# Patient Record
Sex: MALE | Race: ASIAN | NOT HISPANIC OR LATINO | URBAN - METROPOLITAN AREA
[De-identification: names, ages, dates, MRNs, and addresses within clinical notes are randomized per-mention and may not be internally consistent; named-entity substitution may affect disease eponyms.]

---

## 2021-01-16 ENCOUNTER — INPATIENT (INPATIENT)
Facility: HOSPITAL | Age: 36
LOS: 2 days | Discharge: HOME | End: 2021-01-19
Attending: UROLOGY | Admitting: UROLOGY
Payer: MEDICAID

## 2021-01-16 ENCOUNTER — TRANSCRIPTION ENCOUNTER (OUTPATIENT)
Age: 36
End: 2021-01-16

## 2021-01-16 VITALS
OXYGEN SATURATION: 100 % | TEMPERATURE: 100 F | RESPIRATION RATE: 18 BRPM | SYSTOLIC BLOOD PRESSURE: 161 MMHG | HEART RATE: 106 BPM | DIASTOLIC BLOOD PRESSURE: 112 MMHG

## 2021-01-16 LAB
ALBUMIN SERPL ELPH-MCNC: 3.8 G/DL — SIGNIFICANT CHANGE UP (ref 3.3–5)
ALP SERPL-CCNC: 123 U/L — HIGH (ref 40–120)
ALT FLD-CCNC: 15 U/L — SIGNIFICANT CHANGE UP (ref 4–41)
ANION GAP SERPL CALC-SCNC: 20 MMOL/L — HIGH (ref 7–14)
APPEARANCE UR: ABNORMAL
AST SERPL-CCNC: 8 U/L — SIGNIFICANT CHANGE UP (ref 4–40)
BACTERIA # UR AUTO: ABNORMAL
BASOPHILS # BLD AUTO: 0.04 K/UL — SIGNIFICANT CHANGE UP (ref 0–0.2)
BASOPHILS NFR BLD AUTO: 0.3 % — SIGNIFICANT CHANGE UP (ref 0–2)
BILIRUB SERPL-MCNC: 0.8 MG/DL — SIGNIFICANT CHANGE UP (ref 0.2–1.2)
BILIRUB UR-MCNC: NEGATIVE — SIGNIFICANT CHANGE UP
BLOOD GAS VENOUS COMPREHENSIVE RESULT: SIGNIFICANT CHANGE UP
BUN SERPL-MCNC: 79 MG/DL — HIGH (ref 7–23)
CALCIUM SERPL-MCNC: 9.2 MG/DL — SIGNIFICANT CHANGE UP (ref 8.4–10.5)
CHLORIDE SERPL-SCNC: 96 MMOL/L — LOW (ref 98–107)
CO2 SERPL-SCNC: 18 MMOL/L — LOW (ref 22–31)
COLOR SPEC: ABNORMAL
CREAT SERPL-MCNC: 10.39 MG/DL — HIGH (ref 0.5–1.3)
DIFF PNL FLD: ABNORMAL
EOSINOPHIL # BLD AUTO: 0.19 K/UL — SIGNIFICANT CHANGE UP (ref 0–0.5)
EOSINOPHIL NFR BLD AUTO: 1.5 % — SIGNIFICANT CHANGE UP (ref 0–6)
GLUCOSE SERPL-MCNC: 117 MG/DL — HIGH (ref 70–99)
GLUCOSE UR QL: NEGATIVE — SIGNIFICANT CHANGE UP
HCT VFR BLD CALC: 34.5 % — LOW (ref 39–50)
HGB BLD-MCNC: 11.2 G/DL — LOW (ref 13–17)
IANC: 9.7 K/UL — HIGH (ref 1.5–8.5)
IMM GRANULOCYTES NFR BLD AUTO: 0.4 % — SIGNIFICANT CHANGE UP (ref 0–1.5)
KETONES UR-MCNC: NEGATIVE — SIGNIFICANT CHANGE UP
LEUKOCYTE ESTERASE UR-ACNC: ABNORMAL
LIDOCAIN IGE QN: 21 U/L — SIGNIFICANT CHANGE UP (ref 7–60)
LYMPHOCYTES # BLD AUTO: 15.6 % — SIGNIFICANT CHANGE UP (ref 13–44)
LYMPHOCYTES # BLD AUTO: 2.01 K/UL — SIGNIFICANT CHANGE UP (ref 1–3.3)
MCHC RBC-ENTMCNC: 26.6 PG — LOW (ref 27–34)
MCHC RBC-ENTMCNC: 32.5 GM/DL — SIGNIFICANT CHANGE UP (ref 32–36)
MCV RBC AUTO: 81.9 FL — SIGNIFICANT CHANGE UP (ref 80–100)
MONOCYTES # BLD AUTO: 0.88 K/UL — SIGNIFICANT CHANGE UP (ref 0–0.9)
MONOCYTES NFR BLD AUTO: 6.8 % — SIGNIFICANT CHANGE UP (ref 2–14)
NEUTROPHILS # BLD AUTO: 9.7 K/UL — HIGH (ref 1.8–7.4)
NEUTROPHILS NFR BLD AUTO: 75.4 % — SIGNIFICANT CHANGE UP (ref 43–77)
NITRITE UR-MCNC: NEGATIVE — SIGNIFICANT CHANGE UP
NRBC # BLD: 0 /100 WBCS — SIGNIFICANT CHANGE UP
NRBC # FLD: 0 K/UL — SIGNIFICANT CHANGE UP
PH UR: 6 — SIGNIFICANT CHANGE UP (ref 5–8)
PLATELET # BLD AUTO: 302 K/UL — SIGNIFICANT CHANGE UP (ref 150–400)
POTASSIUM SERPL-MCNC: 4.3 MMOL/L — SIGNIFICANT CHANGE UP (ref 3.5–5.3)
POTASSIUM SERPL-SCNC: 4.3 MMOL/L — SIGNIFICANT CHANGE UP (ref 3.5–5.3)
PROT SERPL-MCNC: 8 G/DL — SIGNIFICANT CHANGE UP (ref 6–8.3)
PROT UR-MCNC: ABNORMAL
RBC # BLD: 4.21 M/UL — SIGNIFICANT CHANGE UP (ref 4.2–5.8)
RBC # FLD: 13.4 % — SIGNIFICANT CHANGE UP (ref 10.3–14.5)
RBC CASTS # UR COMP ASSIST: SIGNIFICANT CHANGE UP /HPF (ref 0–4)
SODIUM SERPL-SCNC: 134 MMOL/L — LOW (ref 135–145)
SP GR SPEC: 1.01 — LOW (ref 1.01–1.02)
UROBILINOGEN FLD QL: SIGNIFICANT CHANGE UP
WBC # BLD: 12.87 K/UL — HIGH (ref 3.8–10.5)
WBC # FLD AUTO: 12.87 K/UL — HIGH (ref 3.8–10.5)
WBC UR QL: SIGNIFICANT CHANGE UP /HPF (ref 0–5)

## 2021-01-16 PROCEDURE — 99285 EMERGENCY DEPT VISIT HI MDM: CPT

## 2021-01-16 RX ORDER — SODIUM CHLORIDE 9 MG/ML
1000 INJECTION, SOLUTION INTRAVENOUS ONCE
Refills: 0 | Status: COMPLETED | OUTPATIENT
Start: 2021-01-16 | End: 2021-01-16

## 2021-01-16 RX ORDER — KETOROLAC TROMETHAMINE 30 MG/ML
15 SYRINGE (ML) INJECTION ONCE
Refills: 0 | Status: DISCONTINUED | OUTPATIENT
Start: 2021-01-16 | End: 2021-01-16

## 2021-01-16 RX ADMIN — SODIUM CHLORIDE 1000 MILLILITER(S): 9 INJECTION, SOLUTION INTRAVENOUS at 22:21

## 2021-01-16 RX ADMIN — Medication 15 MILLIGRAM(S): at 22:22

## 2021-01-16 NOTE — ED ADULT NURSE NOTE - OBJECTIVE STATEMENT
Pt presents to  17 AO4 ambulatory complaining of lower abd pain. Pt endorses pain to lower abd which radiates towards BL flank areas but endorsing more R sided pain than L. Pt states "the pain has been going on for a couple of weeks but today was the worst so I went to urgent care and they told me to come here to rule out a kidney stone." Pt denies urinating today, denies urgency or urge. Denies any other abd pain, denies n/v/d, denies sob or diff breathing. Pt knownt o be covid positive 12/28/20. Resp even and unlabored, appears to be resting comfortably and in no obv distress.

## 2021-01-16 NOTE — ED PROVIDER NOTE - PHYSICAL EXAMINATION
I have reviewed the triage vital signs.  Const: AAOx3, in NAD  Eyes: no conjunctival injection  HENT: NCAT, Neck supple, oral mucosa moist  CV: RRR, +S1, S2  Resp: CTAB, no respiratory distress  GI: Abdomen soft, +RLQ TTP, no guarding, R CVA tenderness  Extremities: No peripheral edema,  2+ radial and DP pulses  Skin: Warm, well perfused, no rash  MSK: No gross deformities appreciated  Neuro: No focal sensory or motor deficits  Psych: Appropriate mood and affect

## 2021-01-16 NOTE — ED PROVIDER NOTE - CARE PLAN
Principal Discharge DX:	Stones, urinary tract   Principal Discharge DX:	Stones, urinary tract  Secondary Diagnosis:	Obstructive uropathy

## 2021-01-16 NOTE — ED PROVIDER NOTE - CLINICAL SUMMARY MEDICAL DECISION MAKING FREE TEXT BOX
Venkatesh, PGY2 - 35M PMH nephrolithiasis, HTN, DM p/w flank pain R>L radiating to RLQ. Non-toxic appearing, RLQ TTP, R CVA TTP. DDx includes renal colic, pyelo, less likely appendicitis, low suspicion for vascular etiology. Plan: labs, CT A/P, sx control

## 2021-01-16 NOTE — ED PROVIDER NOTE - NS ED ROS FT
General: Denies fevers or chills  Eyes: Denies vision changes  ENMT: Denies trouble swallowing or speaking, or sore throat  CV: Denies chest pain or palpitations  Resp: Denies cough or SOB  GI: +RLQ pain. No nausea, vomiting, diarrhea, or constipation   : +Urinary retention x 1 day (now resolved). Denies painful urination, increased urinary frequency, or blood in urine  Skin: Denies new rashes  Neuro: Denies headache, numbness, or weakness  MSK: +BL flank pain, R>L

## 2021-01-16 NOTE — ED PROVIDER NOTE - PROGRESS NOTE DETAILS
Venkatesh, PGY2 - CT showing obstructing stones. D/w urology, will eval pt in ED. Pt reevaluated at bedside, reporting pain, will give morphine, updated on current results & plan for uro eval

## 2021-01-16 NOTE — ED PROVIDER NOTE - OBJECTIVE STATEMENT
35M PMH nephrolithiasis, HTN, DM p/w intermittent flank pain R>L x 1-2 weeks. Feels similar to previous episode of renal colic, has never required surgical intervention. Pain has been radiating to RLQ for past week. No testicular pain/swelling/discoloration. Pt states for past 24h he was unable to urinate and did not have the urge to urinate, prompting him to go to UC.  recommended ED eval for concerns of obstructive stone. Pt has since been able to urinate in the ED. Denies f/c, n/v, diarrhea, constipation, dysuria, hematuria. Has had normal appetite.    Of note, pt tested asymptomatically +COVID 12/28 after exposure; states he symptomatically had COVID back in March/April. Denies CP, SOB, URI sx. Pt noted to be hypertensive in triage. Pt states he did not take his BP meds today & did not take pain meds.

## 2021-01-16 NOTE — ED ADULT TRIAGE NOTE - CHIEF COMPLAINT QUOTE
recent covid 12/28-- test positive . no tests since . r abd pains,l flank area pains x 1 wk. last urinated yesterday. states " has no urge to urinate" denies n,v. denies cough. last bp yesterday.  states eating,drinling as usual today recent covid 12/28-- test positive . no tests since . r abd pains,l flank area pains x 1 wk. last urinated yesterday. states " has no urge to urinate" denies n,v. denies cough. last bp meds  yesterday.  states eating,drinking as usual today

## 2021-01-16 NOTE — ED PROVIDER NOTE - ATTENDING CONTRIBUTION TO CARE
35M p/w bilat flank pain R>L x 2 weeks intermittent rad to RLQ.  RLQ ttp.  has had kidney stones before.  Last was eval at [facility].  Pt was unable to urinate x 24 hrs but was able to here.  Plan check CT w/contrast r/o appx.  Rx pain meds, check labs, reass.  Pt tested positive at end of december, but had more severe COVID in March.  Initial plan to get CT with contrast -- changed to noncon due to severely elevated cr.  Pt feeling better, informed of cr elevation and need to stay.    VS:  tachycardia, HTN    GEN - NAD;   malaise;   A+O x3 Cloudy yellow urine at bedside  HEAD - NC/AT     ENT - PEERL, EOMI, mucous membranes  dry , no discharge      NECK: Neck supple, non-tender without lymphadenopathy, no masses, no JVD  PULM - CTA b/l,  symmetric breath sounds  COR -  normal heart sounds    ABD - , ND, RLQ ttp, soft,  BACK - no CVA tenderness, nontender spine     EXTREMS - no edema, no deformity, warm and well perfused    SKIN - no rash    or bruising      NEUROLOGIC - alert, face symmetric, speech fluent, sensation nl, motor no focal deficit.

## 2021-01-16 NOTE — ED ADULT NURSE NOTE - CHIEF COMPLAINT QUOTE
recent covid 12/28-- test positive . no tests since . r abd pains,l flank area pains x 1 wk. last urinated yesterday. states " has no urge to urinate" denies n,v. denies cough. last bp meds  yesterday.  states eating,drinking as usual today

## 2021-01-17 DIAGNOSIS — Z29.9 ENCOUNTER FOR PROPHYLACTIC MEASURES, UNSPECIFIED: ICD-10-CM

## 2021-01-17 DIAGNOSIS — N39.0 URINARY TRACT INFECTION, SITE NOT SPECIFIED: ICD-10-CM

## 2021-01-17 DIAGNOSIS — E78.5 HYPERLIPIDEMIA, UNSPECIFIED: ICD-10-CM

## 2021-01-17 DIAGNOSIS — N17.9 ACUTE KIDNEY FAILURE, UNSPECIFIED: ICD-10-CM

## 2021-01-17 DIAGNOSIS — E11.69 TYPE 2 DIABETES MELLITUS WITH OTHER SPECIFIED COMPLICATION: ICD-10-CM

## 2021-01-17 DIAGNOSIS — N20.9 URINARY CALCULUS, UNSPECIFIED: ICD-10-CM

## 2021-01-17 DIAGNOSIS — N13.9 OBSTRUCTIVE AND REFLUX UROPATHY, UNSPECIFIED: ICD-10-CM

## 2021-01-17 DIAGNOSIS — I10 ESSENTIAL (PRIMARY) HYPERTENSION: ICD-10-CM

## 2021-01-17 LAB
ANION GAP SERPL CALC-SCNC: 16 MMOL/L — HIGH (ref 7–14)
ANION GAP SERPL CALC-SCNC: 16 MMOL/L — HIGH (ref 7–14)
ANION GAP SERPL CALC-SCNC: 17 MMOL/L — HIGH (ref 7–14)
BUN SERPL-MCNC: 64 MG/DL — HIGH (ref 7–23)
BUN SERPL-MCNC: 68 MG/DL — HIGH (ref 7–23)
BUN SERPL-MCNC: 74 MG/DL — HIGH (ref 7–23)
CALCIUM SERPL-MCNC: 9.3 MG/DL — SIGNIFICANT CHANGE UP (ref 8.4–10.5)
CALCIUM SERPL-MCNC: 9.4 MG/DL — SIGNIFICANT CHANGE UP (ref 8.4–10.5)
CALCIUM SERPL-MCNC: 9.7 MG/DL — SIGNIFICANT CHANGE UP (ref 8.4–10.5)
CHLORIDE SERPL-SCNC: 101 MMOL/L — SIGNIFICANT CHANGE UP (ref 98–107)
CHLORIDE SERPL-SCNC: 102 MMOL/L — SIGNIFICANT CHANGE UP (ref 98–107)
CHLORIDE SERPL-SCNC: 103 MMOL/L — SIGNIFICANT CHANGE UP (ref 98–107)
CO2 SERPL-SCNC: 21 MMOL/L — LOW (ref 22–31)
CREAT SERPL-MCNC: 6.01 MG/DL — HIGH (ref 0.5–1.3)
CREAT SERPL-MCNC: 7.41 MG/DL — HIGH (ref 0.5–1.3)
CREAT SERPL-MCNC: 9.38 MG/DL — HIGH (ref 0.5–1.3)
GLUCOSE BLDC GLUCOMTR-MCNC: 105 MG/DL — HIGH (ref 70–99)
GLUCOSE BLDC GLUCOMTR-MCNC: 120 MG/DL — HIGH (ref 70–99)
GLUCOSE BLDC GLUCOMTR-MCNC: 130 MG/DL — HIGH (ref 70–99)
GLUCOSE BLDC GLUCOMTR-MCNC: 152 MG/DL — HIGH (ref 70–99)
GLUCOSE SERPL-MCNC: 115 MG/DL — HIGH (ref 70–99)
GLUCOSE SERPL-MCNC: 124 MG/DL — HIGH (ref 70–99)
GLUCOSE SERPL-MCNC: 140 MG/DL — HIGH (ref 70–99)
MAGNESIUM SERPL-MCNC: 2.2 MG/DL — SIGNIFICANT CHANGE UP (ref 1.6–2.6)
MAGNESIUM SERPL-MCNC: 2.4 MG/DL — SIGNIFICANT CHANGE UP (ref 1.6–2.6)
MAGNESIUM SERPL-MCNC: 2.4 MG/DL — SIGNIFICANT CHANGE UP (ref 1.6–2.6)
PHOSPHATE SERPL-MCNC: 5.8 MG/DL — HIGH (ref 2.5–4.5)
PHOSPHATE SERPL-MCNC: 6.8 MG/DL — HIGH (ref 2.5–4.5)
PHOSPHATE SERPL-MCNC: 7.3 MG/DL — HIGH (ref 2.5–4.5)
POTASSIUM SERPL-MCNC: 4.6 MMOL/L — SIGNIFICANT CHANGE UP (ref 3.5–5.3)
POTASSIUM SERPL-MCNC: 4.8 MMOL/L — SIGNIFICANT CHANGE UP (ref 3.5–5.3)
POTASSIUM SERPL-MCNC: 4.8 MMOL/L — SIGNIFICANT CHANGE UP (ref 3.5–5.3)
POTASSIUM SERPL-SCNC: 4.6 MMOL/L — SIGNIFICANT CHANGE UP (ref 3.5–5.3)
POTASSIUM SERPL-SCNC: 4.8 MMOL/L — SIGNIFICANT CHANGE UP (ref 3.5–5.3)
POTASSIUM SERPL-SCNC: 4.8 MMOL/L — SIGNIFICANT CHANGE UP (ref 3.5–5.3)
SARS-COV-2 RNA SPEC QL NAA+PROBE: SIGNIFICANT CHANGE UP
SODIUM SERPL-SCNC: 138 MMOL/L — SIGNIFICANT CHANGE UP (ref 135–145)
SODIUM SERPL-SCNC: 140 MMOL/L — SIGNIFICANT CHANGE UP (ref 135–145)
SODIUM SERPL-SCNC: 140 MMOL/L — SIGNIFICANT CHANGE UP (ref 135–145)

## 2021-01-17 PROCEDURE — 74176 CT ABD & PELVIS W/O CONTRAST: CPT | Mod: 26

## 2021-01-17 PROCEDURE — 52332 CYSTOSCOPY AND TREATMENT: CPT | Mod: 50

## 2021-01-17 PROCEDURE — 74420 UROGRAPHY RTRGR +-KUB: CPT | Mod: 26

## 2021-01-17 PROCEDURE — 99223 1ST HOSP IP/OBS HIGH 75: CPT

## 2021-01-17 RX ORDER — MORPHINE SULFATE 50 MG/1
4 CAPSULE, EXTENDED RELEASE ORAL ONCE
Refills: 0 | Status: DISCONTINUED | OUTPATIENT
Start: 2021-01-17 | End: 2021-01-17

## 2021-01-17 RX ORDER — SODIUM CHLORIDE 9 MG/ML
1000 INJECTION, SOLUTION INTRAVENOUS
Refills: 0 | Status: DISCONTINUED | OUTPATIENT
Start: 2021-01-17 | End: 2021-01-19

## 2021-01-17 RX ORDER — ACETAMINOPHEN 500 MG
650 TABLET ORAL EVERY 6 HOURS
Refills: 0 | Status: DISCONTINUED | OUTPATIENT
Start: 2021-01-17 | End: 2021-01-19

## 2021-01-17 RX ORDER — CEFTRIAXONE 500 MG/1
1000 INJECTION, POWDER, FOR SOLUTION INTRAMUSCULAR; INTRAVENOUS ONCE
Refills: 0 | Status: COMPLETED | OUTPATIENT
Start: 2021-01-17 | End: 2021-01-17

## 2021-01-17 RX ORDER — DEXTROSE 50 % IN WATER 50 %
15 SYRINGE (ML) INTRAVENOUS ONCE
Refills: 0 | Status: DISCONTINUED | OUTPATIENT
Start: 2021-01-17 | End: 2021-01-19

## 2021-01-17 RX ORDER — DEXTROSE 50 % IN WATER 50 %
25 SYRINGE (ML) INTRAVENOUS ONCE
Refills: 0 | Status: DISCONTINUED | OUTPATIENT
Start: 2021-01-17 | End: 2021-01-19

## 2021-01-17 RX ORDER — INSULIN LISPRO 100/ML
VIAL (ML) SUBCUTANEOUS AT BEDTIME
Refills: 0 | Status: DISCONTINUED | OUTPATIENT
Start: 2021-01-17 | End: 2021-01-19

## 2021-01-17 RX ORDER — SODIUM CHLORIDE 9 MG/ML
1000 INJECTION INTRAMUSCULAR; INTRAVENOUS; SUBCUTANEOUS ONCE
Refills: 0 | Status: COMPLETED | OUTPATIENT
Start: 2021-01-17 | End: 2021-01-17

## 2021-01-17 RX ORDER — FENTANYL CITRATE 50 UG/ML
25 INJECTION INTRAVENOUS
Refills: 0 | Status: DISCONTINUED | OUTPATIENT
Start: 2021-01-17 | End: 2021-01-17

## 2021-01-17 RX ORDER — SODIUM CHLORIDE 9 MG/ML
1000 INJECTION INTRAMUSCULAR; INTRAVENOUS; SUBCUTANEOUS
Refills: 0 | Status: DISCONTINUED | OUTPATIENT
Start: 2021-01-17 | End: 2021-01-17

## 2021-01-17 RX ORDER — CEFTRIAXONE 500 MG/1
1000 INJECTION, POWDER, FOR SOLUTION INTRAMUSCULAR; INTRAVENOUS EVERY 24 HOURS
Refills: 0 | Status: DISCONTINUED | OUTPATIENT
Start: 2021-01-17 | End: 2021-01-19

## 2021-01-17 RX ORDER — HEPARIN SODIUM 5000 [USP'U]/ML
5000 INJECTION INTRAVENOUS; SUBCUTANEOUS EVERY 8 HOURS
Refills: 0 | Status: DISCONTINUED | OUTPATIENT
Start: 2021-01-17 | End: 2021-01-19

## 2021-01-17 RX ORDER — INSULIN LISPRO 100/ML
VIAL (ML) SUBCUTANEOUS
Refills: 0 | Status: DISCONTINUED | OUTPATIENT
Start: 2021-01-17 | End: 2021-01-19

## 2021-01-17 RX ORDER — LISINOPRIL 2.5 MG/1
20 TABLET ORAL DAILY
Refills: 0 | Status: DISCONTINUED | OUTPATIENT
Start: 2021-01-17 | End: 2021-01-17

## 2021-01-17 RX ORDER — GLUCAGON INJECTION, SOLUTION 0.5 MG/.1ML
1 INJECTION, SOLUTION SUBCUTANEOUS ONCE
Refills: 0 | Status: DISCONTINUED | OUTPATIENT
Start: 2021-01-17 | End: 2021-01-19

## 2021-01-17 RX ORDER — DEXTROSE 50 % IN WATER 50 %
12.5 SYRINGE (ML) INTRAVENOUS ONCE
Refills: 0 | Status: DISCONTINUED | OUTPATIENT
Start: 2021-01-17 | End: 2021-01-19

## 2021-01-17 RX ORDER — AMLODIPINE BESYLATE 2.5 MG/1
5 TABLET ORAL ONCE
Refills: 0 | Status: COMPLETED | OUTPATIENT
Start: 2021-01-17 | End: 2021-01-17

## 2021-01-17 RX ADMIN — HEPARIN SODIUM 5000 UNIT(S): 5000 INJECTION INTRAVENOUS; SUBCUTANEOUS at 13:31

## 2021-01-17 RX ADMIN — HEPARIN SODIUM 5000 UNIT(S): 5000 INJECTION INTRAVENOUS; SUBCUTANEOUS at 21:39

## 2021-01-17 RX ADMIN — MORPHINE SULFATE 4 MILLIGRAM(S): 50 CAPSULE, EXTENDED RELEASE ORAL at 01:15

## 2021-01-17 RX ADMIN — CEFTRIAXONE 100 MILLIGRAM(S): 500 INJECTION, POWDER, FOR SOLUTION INTRAMUSCULAR; INTRAVENOUS at 02:30

## 2021-01-17 RX ADMIN — HEPARIN SODIUM 5000 UNIT(S): 5000 INJECTION INTRAVENOUS; SUBCUTANEOUS at 06:56

## 2021-01-17 RX ADMIN — Medication 1: at 12:28

## 2021-01-17 RX ADMIN — SODIUM CHLORIDE 1000 MILLILITER(S): 9 INJECTION INTRAMUSCULAR; INTRAVENOUS; SUBCUTANEOUS at 17:36

## 2021-01-17 RX ADMIN — AMLODIPINE BESYLATE 5 MILLIGRAM(S): 2.5 TABLET ORAL at 14:04

## 2021-01-17 RX ADMIN — LISINOPRIL 20 MILLIGRAM(S): 2.5 TABLET ORAL at 09:51

## 2021-01-17 NOTE — CONSULT NOTE ADULT - PROBLEM SELECTOR RECOMMENDATION 3
Dx'ed last May, On Metformin 1000mg 2X/day at home   -Check A1C in AM  -FSSS with Admelog coverage (+) UA, (+) pyelo on CT scan  -Cont. Ceftriaxone  -f/u urine cx.  -may need 10-14 days abx for pyelo

## 2021-01-17 NOTE — H&P ADULT - HISTORY OF PRESENT ILLNESS
Mr Barriga is a 35 year old male with hx of HTN, DM and kidney stones presenting with bilateral flank pain and decreased urine output.  The pain started a few days ago on the left, followed by the right. No nausea or vomiting. Brief light hematuria. No dysuria or fevers.  Last urinated 2 days ago.

## 2021-01-17 NOTE — CONSULT NOTE ADULT - PROBLEM SELECTOR RECOMMENDATION 4
On Lisinopril at home  -Cont Lisinopril and monitor BP Dx'ed last May, On Metformin 1000mg 2X/day at home   -Check A1C in AM  -FSSS with Admelog coverage

## 2021-01-17 NOTE — H&P ADULT - ASSESSMENT
35M with hx of HTN and DM presenting with bilateral obstructing ureteral stones and SCr 10    - NPO  - OR for emergent bilateral ureteral stent placement  - Followup urine culture  - Will start empiric ceftriaxone  - Trend SCr  - Mercedes catheter to drainage

## 2021-01-17 NOTE — H&P ADULT - NSHPLABSRESULTS_GEN_ALL_CORE
11.2   12.87 )-----------( 302      ( 16 Jan 2021 22:27 )             34.5   01-16    134<L>  |  96<L>  |  79<H>  ----------------------------<  117<H>  4.3   |  18<L>  |  10.39<H>    Ca    9.2      16 Jan 2021 22:27    TPro  8.0  /  Alb  3.8  /  TBili  0.8  /  DBili  x   /  AST  8   /  ALT  15  /  AlkPhos  123<H>  01-16    < from: CT Abdomen and Pelvis No Cont (01.17.21 @ 00:25) >    IMPRESSION:  Moderate right hydroureteronephrosis to the level of an 8mm mm obstructing proximal ureteral stone. Moderate left hydroureteronephrosis to the level of a 1.9 cm obstructing proximal ureteral stone. Associated bilateral perinephric stranding and small volume right-sided perinephric fluid.    Punctate nonobstructing right lower pole and mid pole calculi.        < end of copied text >

## 2021-01-17 NOTE — CONSULT NOTE ADULT - PROBLEM SELECTOR RECOMMENDATION 5
On Atoverstatin at home  -Cont Atoverstatin On Lisinopril at home  -D/C Lisinopril  -Monitor BP  -Consider amlodipine if BP elevated

## 2021-01-17 NOTE — ED ADULT NURSE REASSESSMENT NOTE - NS ED NURSE REASSESS COMMENT FT1
Pt belongings secured, valuables secured with Pt as witness and brought to security by MADHAVI Rodríguez. Pt belongings secured, valuables secured with Pt as witness and brought to security by PCA Anne. Pt denies any complaints, appears to be resting comfortably. Clothing brought over to Low acuity BH.

## 2021-01-17 NOTE — CONSULT NOTE ADULT - PROBLEM SELECTOR RECOMMENDATION 2
(+) UA, (+) pyelo on CT scan  -Cont. Ceftriaxone  -f/u urine cx.  -may need 10-14 days abx for pyelo Likely due to obstruction. S/P ureteral stents.  -Moniotor for post obstructive diuresis  -IVF hydration  -D/C Lisinopril  -Trend BUN/creat  -Avoid nephrotoxic agents

## 2021-01-17 NOTE — CONSULT NOTE ADULT - PROBLEM SELECTOR PROBLEM 3
Type 2 diabetes mellitus with other specified complication, without long-term current use of insulin UTI (urinary tract infection)

## 2021-01-18 DIAGNOSIS — I10 ESSENTIAL (PRIMARY) HYPERTENSION: ICD-10-CM

## 2021-01-18 LAB
A1C WITH ESTIMATED AVERAGE GLUCOSE RESULT: 7 % — HIGH (ref 4–5.6)
ANION GAP SERPL CALC-SCNC: 13 MMOL/L — SIGNIFICANT CHANGE UP (ref 7–14)
ANION GAP SERPL CALC-SCNC: 15 MMOL/L — HIGH (ref 7–14)
ANION GAP SERPL CALC-SCNC: 15 MMOL/L — HIGH (ref 7–14)
BUN SERPL-MCNC: 47 MG/DL — HIGH (ref 7–23)
BUN SERPL-MCNC: 53 MG/DL — HIGH (ref 7–23)
BUN SERPL-MCNC: 60 MG/DL — HIGH (ref 7–23)
CALCIUM SERPL-MCNC: 9.6 MG/DL — SIGNIFICANT CHANGE UP (ref 8.4–10.5)
CALCIUM SERPL-MCNC: 9.8 MG/DL — SIGNIFICANT CHANGE UP (ref 8.4–10.5)
CALCIUM SERPL-MCNC: 9.8 MG/DL — SIGNIFICANT CHANGE UP (ref 8.4–10.5)
CHLORIDE SERPL-SCNC: 103 MMOL/L — SIGNIFICANT CHANGE UP (ref 98–107)
CHLORIDE SERPL-SCNC: 104 MMOL/L — SIGNIFICANT CHANGE UP (ref 98–107)
CHLORIDE SERPL-SCNC: 104 MMOL/L — SIGNIFICANT CHANGE UP (ref 98–107)
CO2 SERPL-SCNC: 20 MMOL/L — LOW (ref 22–31)
CO2 SERPL-SCNC: 21 MMOL/L — LOW (ref 22–31)
CO2 SERPL-SCNC: 22 MMOL/L — SIGNIFICANT CHANGE UP (ref 22–31)
CREAT SERPL-MCNC: 3.25 MG/DL — HIGH (ref 0.5–1.3)
CREAT SERPL-MCNC: 4.02 MG/DL — HIGH (ref 0.5–1.3)
CREAT SERPL-MCNC: 4.85 MG/DL — HIGH (ref 0.5–1.3)
CULTURE RESULTS: SIGNIFICANT CHANGE UP
ESTIMATED AVERAGE GLUCOSE: 154 MG/DL — HIGH (ref 68–114)
GLUCOSE BLDC GLUCOMTR-MCNC: 108 MG/DL — HIGH (ref 70–99)
GLUCOSE BLDC GLUCOMTR-MCNC: 116 MG/DL — HIGH (ref 70–99)
GLUCOSE BLDC GLUCOMTR-MCNC: 93 MG/DL — SIGNIFICANT CHANGE UP (ref 70–99)
GLUCOSE BLDC GLUCOMTR-MCNC: 99 MG/DL — SIGNIFICANT CHANGE UP (ref 70–99)
GLUCOSE SERPL-MCNC: 107 MG/DL — HIGH (ref 70–99)
GLUCOSE SERPL-MCNC: 121 MG/DL — HIGH (ref 70–99)
GLUCOSE SERPL-MCNC: 137 MG/DL — HIGH (ref 70–99)
MAGNESIUM SERPL-MCNC: 2 MG/DL — SIGNIFICANT CHANGE UP (ref 1.6–2.6)
MAGNESIUM SERPL-MCNC: 2 MG/DL — SIGNIFICANT CHANGE UP (ref 1.6–2.6)
MAGNESIUM SERPL-MCNC: 2.2 MG/DL — SIGNIFICANT CHANGE UP (ref 1.6–2.6)
PHOSPHATE SERPL-MCNC: 4.7 MG/DL — HIGH (ref 2.5–4.5)
PHOSPHATE SERPL-MCNC: 5.2 MG/DL — HIGH (ref 2.5–4.5)
PHOSPHATE SERPL-MCNC: 5.5 MG/DL — HIGH (ref 2.5–4.5)
POTASSIUM SERPL-MCNC: 4.3 MMOL/L — SIGNIFICANT CHANGE UP (ref 3.5–5.3)
POTASSIUM SERPL-MCNC: 4.4 MMOL/L — SIGNIFICANT CHANGE UP (ref 3.5–5.3)
POTASSIUM SERPL-MCNC: 4.5 MMOL/L — SIGNIFICANT CHANGE UP (ref 3.5–5.3)
POTASSIUM SERPL-SCNC: 4.3 MMOL/L — SIGNIFICANT CHANGE UP (ref 3.5–5.3)
POTASSIUM SERPL-SCNC: 4.4 MMOL/L — SIGNIFICANT CHANGE UP (ref 3.5–5.3)
POTASSIUM SERPL-SCNC: 4.5 MMOL/L — SIGNIFICANT CHANGE UP (ref 3.5–5.3)
SODIUM SERPL-SCNC: 138 MMOL/L — SIGNIFICANT CHANGE UP (ref 135–145)
SODIUM SERPL-SCNC: 139 MMOL/L — SIGNIFICANT CHANGE UP (ref 135–145)
SODIUM SERPL-SCNC: 140 MMOL/L — SIGNIFICANT CHANGE UP (ref 135–145)
SPECIMEN SOURCE: SIGNIFICANT CHANGE UP

## 2021-01-18 PROCEDURE — 99232 SBSQ HOSP IP/OBS MODERATE 35: CPT | Mod: 25

## 2021-01-18 PROCEDURE — 99222 1ST HOSP IP/OBS MODERATE 55: CPT

## 2021-01-18 PROCEDURE — 99233 SBSQ HOSP IP/OBS HIGH 50: CPT

## 2021-01-18 RX ORDER — AMLODIPINE BESYLATE 2.5 MG/1
5 TABLET ORAL DAILY
Refills: 0 | Status: DISCONTINUED | OUTPATIENT
Start: 2021-01-18 | End: 2021-01-18

## 2021-01-18 RX ORDER — AMLODIPINE BESYLATE 2.5 MG/1
5 TABLET ORAL ONCE
Refills: 0 | Status: COMPLETED | OUTPATIENT
Start: 2021-01-18 | End: 2021-01-18

## 2021-01-18 RX ORDER — SODIUM CHLORIDE 9 MG/ML
1000 INJECTION INTRAMUSCULAR; INTRAVENOUS; SUBCUTANEOUS
Refills: 0 | Status: DISCONTINUED | OUTPATIENT
Start: 2021-01-18 | End: 2021-01-19

## 2021-01-18 RX ORDER — SODIUM CHLORIDE 9 MG/ML
1000 INJECTION INTRAMUSCULAR; INTRAVENOUS; SUBCUTANEOUS ONCE
Refills: 0 | Status: COMPLETED | OUTPATIENT
Start: 2021-01-18 | End: 2021-01-18

## 2021-01-18 RX ORDER — AMLODIPINE BESYLATE 2.5 MG/1
10 TABLET ORAL DAILY
Refills: 0 | Status: DISCONTINUED | OUTPATIENT
Start: 2021-01-19 | End: 2021-01-19

## 2021-01-18 RX ORDER — SODIUM CHLORIDE 9 MG/ML
1000 INJECTION, SOLUTION INTRAVENOUS
Refills: 0 | Status: COMPLETED | OUTPATIENT
Start: 2021-01-18 | End: 2021-01-18

## 2021-01-18 RX ADMIN — HEPARIN SODIUM 5000 UNIT(S): 5000 INJECTION INTRAVENOUS; SUBCUTANEOUS at 06:32

## 2021-01-18 RX ADMIN — HEPARIN SODIUM 5000 UNIT(S): 5000 INJECTION INTRAVENOUS; SUBCUTANEOUS at 14:52

## 2021-01-18 RX ADMIN — HEPARIN SODIUM 5000 UNIT(S): 5000 INJECTION INTRAVENOUS; SUBCUTANEOUS at 22:27

## 2021-01-18 RX ADMIN — CEFTRIAXONE 100 MILLIGRAM(S): 500 INJECTION, POWDER, FOR SOLUTION INTRAMUSCULAR; INTRAVENOUS at 02:47

## 2021-01-18 RX ADMIN — SODIUM CHLORIDE 125 MILLILITER(S): 9 INJECTION INTRAMUSCULAR; INTRAVENOUS; SUBCUTANEOUS at 12:56

## 2021-01-18 RX ADMIN — AMLODIPINE BESYLATE 5 MILLIGRAM(S): 2.5 TABLET ORAL at 10:50

## 2021-01-18 RX ADMIN — SODIUM CHLORIDE 1000 MILLILITER(S): 9 INJECTION, SOLUTION INTRAVENOUS at 10:15

## 2021-01-18 RX ADMIN — Medication 650 MILLIGRAM(S): at 02:07

## 2021-01-18 RX ADMIN — AMLODIPINE BESYLATE 5 MILLIGRAM(S): 2.5 TABLET ORAL at 02:07

## 2021-01-18 RX ADMIN — SODIUM CHLORIDE 1000 MILLILITER(S): 9 INJECTION INTRAMUSCULAR; INTRAVENOUS; SUBCUTANEOUS at 06:32

## 2021-01-18 RX ADMIN — AMLODIPINE BESYLATE 5 MILLIGRAM(S): 2.5 TABLET ORAL at 12:55

## 2021-01-18 NOTE — CONSULT NOTE ADULT - ATTENDING COMMENTS
Patient examined and ROS reviewed. A case of RAMIREZ in the setting obstructive uropathy (bilaeral ureteral kideny stones with hydro). Patient is diuresing after bilateral stent placement. Patient has background DM and HTN. Patient has massive diuresis. Advised IV fluids replacement with NS. Monitor electrolytes.

## 2021-01-18 NOTE — CONSULT NOTE ADULT - SUBJECTIVE AND OBJECTIVE BOX
Patient is a 35y old  Male who presents with a chief complaint of Flank pain.     HPI:  Mr Barriga is a 35 year old male with hx of HTN, DM, HLD and kidney stones presenting with bilateral flank pain and decreased urine output.  The pain started a few days ago on the left, followed by the right. No nausea or vomiting. Brief light hematuria. No dysuria or fevers.  Last urinated 2 days ago. (2021 02:09). Pt found to have b/l hydronephrosis with obstructing stones. S/P OR for ureteral stents b/l. Currently, pt has no complaints.       History limited due to: [ ] Dementia  [ ] Delirium  [ ] Condition    Pain Symptoms if applicable:             	                         none	     Pain:	                            0	    Location:	  Modifying factors:	  Associated symptoms:	    Function: [ x] Independent  [ ] Assistance  [ ] Total care  [ ] Non-ambulatory    Allergies    No Known Allergies    Intolerances        HOME MEDICATIONS: [x ] Reviewed    MEDICATIONS  (STANDING):  cefTRIAXone   IVPB 1000 milliGRAM(s) IV Intermittent every 24 hours  dextrose 40% Gel 15 Gram(s) Oral once  dextrose 5%. 1000 milliLiter(s) (50 mL/Hr) IV Continuous <Continuous>  dextrose 5%. 1000 milliLiter(s) (100 mL/Hr) IV Continuous <Continuous>  dextrose 50% Injectable 25 Gram(s) IV Push once  dextrose 50% Injectable 12.5 Gram(s) IV Push once  dextrose 50% Injectable 25 Gram(s) IV Push once  glucagon  Injectable 1 milliGRAM(s) IntraMuscular once  heparin   Injectable 5000 Unit(s) SubCutaneous every 8 hours  insulin lispro (ADMELOG) corrective regimen sliding scale   SubCutaneous three times a day before meals  insulin lispro (ADMELOG) corrective regimen sliding scale   SubCutaneous at bedtime  lisinopril 20 milliGRAM(s) Oral daily    MEDICATIONS  (PRN):  acetaminophen   Tablet .. 650 milliGRAM(s) Oral every 6 hours PRN Mild Pain (1 - 3)      PAST MEDICAL & SURGICAL HISTORY:  Nephrolithiasis    DM (diabetes mellitus)    HTN (hypertension)  HLD    [x ] Reviewed     SOCIAL HISTORY:  Residence: [ ] [facility]  [ ] [facility]  [x ] Community  [ ] Substance abuse: denies  [ ] Tobacco: denies  [ ] Alcohol use: denies    FAMILY HISTORY:  [x ] No pertinent family history in first degree relatives     REVIEW OF SYSTEMS:    CONSTITUTIONAL: No fever, weight loss, or fatigue  EYES: No eye pain, visual disturbances, or discharge  ENMT:  No difficulty hearing, tinnitus, vertigo; No sinus or throat pain  NECK: No pain or stiffness  BREASTS: No pain, masses, or nipple discharge  RESPIRATORY: No cough, wheezing, chills or hemoptysis; No shortness of breath  CARDIOVASCULAR: No chest pain, palpitations, dizziness, or leg swelling  GASTROINTESTINAL: No abdominal or epigastric pain. No nausea, vomiting, or hematemesis; No diarrhea or constipation. No melena or hematochezia.  GENITOURINARY: (+) b/l flank pain, unable to urinate, (+) heart post op  NEUROLOGICAL: No headaches, memory loss, loss of strength, numbness, or tremors  SKIN: No itching, burning, rashes, or lesions   LYMPH NODES: No enlarged glands  ENDOCRINE: No heat or cold intolerance; No hair loss  MUSCULOSKELETAL: No muscle or back pain  PSYCHIATRIC: No depression, anxiety, mood swings, or difficulty sleeping  HEME/LYMPH: No easy bruising, or bleeding gums  ALLERGY AND IMMUNOLOGIC: No hives or eczema    [  ] All other ROS negative  [  ] Unable to obtain due to poor mental status    Vital Signs Last 24 Hrs  T(C): 37.2 (2021 09:43), Max: 37.9 (2021 00:10)  T(F): 98.9 (2021 09:43), Max: 100.2 (2021 00:10)  HR: 89 (2021 09:43) (89 - 110)  BP: 126/94 (2021 09:43) (126/94 - 182/112)  BP(mean): 101 (2021 07:30) (100 - 108)  RR: 17 (2021 09:43) (11 - 24)  SpO2: 100% (2021 09:43) (94% - 100%)    PHYSICAL EXAM:    GENERAL: NAD, well-groomed, well-developed  HEAD:  Atraumatic, Normocephalic  EYES: EOMI, PERRLA, conjunctiva and sclera clear  ENMT: Moist mucous membranes  NECK: Supple, No JVD  RESPIRATORY: Clear to auscultation bilaterally; No rales, rhonchi, wheezing, or rubs  CARDIOVASCULAR: Regular rate and rhythm; No murmurs, rubs, or gallops  GASTROINTESTINAL: Soft, Nontender, Nondistended; Bowel sounds present  GENITOURINARY: (+) heart   EXTREMITIES:  2+ Peripheral Pulses, No clubbing, cyanosis, or edema  NERVOUS SYSTEM:  Alert & Oriented X3; Moving all 4 extremities; No gross sensory deficits  HEME/LYMPH: No lymphadenopathy noted  SKIN: No rashes or lesions; Incisions C/D/I    LABS:                        11.2   12.87 )-----------( 302      ( 2021 22:27 )             34.5     -    134<L>  |  96<L>  |  79<H>  ----------------------------<  117<H>  4.3   |  18<L>  |  10.39<H>    Ca    9.2      2021 22:27    TPro  8.0  /  Alb  3.8  /  TBili  0.8  /  DBili  x   /  AST  8   /  ALT  15  /  AlkPhos  123<H>        Urinalysis Basic - ( 2021 22:27 )    Color: Light Orange / Appearance: Slightly Turbid / S.008 / pH: x  Gluc: x / Ketone: Negative  / Bili: Negative / Urobili: <2 mg/dL   Blood: x / Protein: 100 mg/dL / Nitrite: Negative   Leuk Esterase: Moderate / RBC: 6-10 /HPF / WBC 25-50 /HPF   Sq Epi: x / Non Sq Epi: x / Bacteria: Moderate      CAPILLARY BLOOD GLUCOSE      POCT Blood Glucose.: 130 mg/dL (2021 06:10)      RADIOLOGY & ADDITIONAL STUDIES:    EKG:   Personally Reviewed:  [ ] YES     Imaging:   Personally Reviewed:  [ ] YES               Consultant(s) notes reviewed:    Care Discussed with Consultant(s)/Other Providers:    Advanced Directives: [ ] DNR  [ ] No feeding tube  [ ] MOLST in chart  [ ] MOLST completed today  [ x] Unknown    
[facility] DIVISION OF KIDNEY DISEASES AND HYPERTENSION -- [phone]  -- INITIAL CONSULT NOTE  --------------------------------------------------------------------------------  HPI: 35-year-old male with DM, HTN, and history of kidney stones was admitted to [facility] [place] on 1/17/21 for obstructing kidney stone. Pt. presented to the ER complaining of flank pain ~ 2 weeks. CT Abdomen in the ER showed bilateral hydronephrosis with bilateral obstructing stones and pyelonephritis. Pt. underwent bilateral ureteral stent placement by urology team on 1/17/21. Nephrology team consulted for elevated serum creatinine. Pt. denies any personal or family history of kidney disease, however gives history of renal stones in the past. Of note, pt. on ACE-i therapy for BP management. No prior labs available for review. On arrival, Scr was 10.39. Scr has improved to 4.85 today. Pt. with 8.45L of UOP in the last 24 hours.   Pt. was seen and examined at bedside today. Pt. reports feeling better but complains of sore throat and coughing after returning from OR probably related to intubation. Of note, pt. tested positive for COVID 19 in March 2020.    PAST HISTORY  --------------------------------------------------------------------------------  PAST MEDICAL & SURGICAL HISTORY:  Nephrolithiasis  DM (diabetes mellitus)  HTN (hypertension)  No significant past surgical history    FAMILY HISTORY: Non-contributory    PAST SOCIAL HISTORY: Denies current alcohol or tobacco use    ALLERGIES & MEDICATIONS  --------------------------------------------------------------------------------  Allergies    No Known Allergies    Intolerances    Standing Inpatient Medications  amLODIPine   Tablet 5 milliGRAM(s) Oral daily  cefTRIAXone   IVPB 1000 milliGRAM(s) IV Intermittent every 24 hours  dextrose 40% Gel 15 Gram(s) Oral once  dextrose 5%. 1000 milliLiter(s) IV Continuous <Continuous>  dextrose 5%. 1000 milliLiter(s) IV Continuous <Continuous>  dextrose 50% Injectable 25 Gram(s) IV Push once  dextrose 50% Injectable 12.5 Gram(s) IV Push once  dextrose 50% Injectable 25 Gram(s) IV Push once  glucagon  Injectable 1 milliGRAM(s) IntraMuscular once  heparin   Injectable 5000 Unit(s) SubCutaneous every 8 hours  insulin lispro (ADMELOG) corrective regimen sliding scale   SubCutaneous three times a day before meals  insulin lispro (ADMELOG) corrective regimen sliding scale   SubCutaneous at bedtime    REVIEW OF SYSTEMS  --------------------------------------------------------------------------------  As per HPI    VITALS/PHYSICAL EXAM  --------------------------------------------------------------------------------  T(C): 36.8 (01-18-21 @ 09:13), Max: 37.7 (01-18-21 @ 01:29)  HR: 99 (01-18-21 @ 09:13) (90 - 107)  BP: 146/97 (01-18-21 @ 09:13) (138/95 - 157/105)  RR: 18 (01-18-21 @ 09:13) (16 - 18)  SpO2: 98% (01-18-21 @ 09:13) (97% - 100%)  Wt(kg): --    01-17-21 @ 07:01  -  01-18-21 @ 07:00  --------------------------------------------------------  IN: 240 mL / OUT: 8450 mL / NET: -8210 mL    01-18-21 @ 07:01  -  01-18-21 @ 10:58  --------------------------------------------------------  IN: 0 mL / OUT: 1400 mL / NET: -1400 mL    Physical Exam:  	Gen: Well appearing male in NAD  	HEENT: Anicteric  	Pulm: No tachypnea, coughing  	CV: S1S2  	Abd: Soft, +BS  	Ext: No LE edema B/L  	Neuro: Awake  	Skin: Warm and dry    LABS/STUDIES  --------------------------------------------------------------------------------              11.2   12.87 >-----------<  302      [01-16-21 @ 22:27]              34.5     138  |  103  |  60  ----------------------------<  137      [01-18-21 @ 04:13]  4.5   |  20  |  4.85        Ca     9.8     [01-18-21 @ 04:13]      Mg     2.2     [01-18-21 @ 04:13]      Phos  5.5     [01-18-21 @ 04:13]    Creatinine Trend:  SCr 4.85 [01-18 @ 04:13]  SCr 6.01 [01-17 @ 22:43]  SCr 7.41 [01-17 @ 16:54]  SCr 9.38 [01-17 @ 10:13]  SCr 10.39 [01-16 @ 22:27]    Urinalysis - [01-16-21 @ 22:27]      Color Light Orange / Appearance Slightly Turbid / SG 1.008 / pH 6.0      Gluc Negative / Ketone Negative  / Bili Negative / Urobili <2 mg/dL       Blood Moderate / Protein 100 mg/dL / Leuk Est Moderate / Nitrite Negative      RBC 6-10 / WBC 25-50 / Hyaline  / Gran  / Sq Epi  / Non Sq Epi  / Bacteria Moderate

## 2021-01-18 NOTE — PROGRESS NOTE ADULT - SUBJECTIVE AND OBJECTIVE BOX
Patient is a 35y old  Male who presents with a chief complaint of flank pain and unable to urinate    SUBJECTIVE / OVERNIGHT EVENTS: No complaints today     MEDICATIONS  (STANDING):  amLODIPine   Tablet 5 milliGRAM(s) Oral daily  cefTRIAXone   IVPB 1000 milliGRAM(s) IV Intermittent every 24 hours  dextrose 40% Gel 15 Gram(s) Oral once  dextrose 5%. 1000 milliLiter(s) (50 mL/Hr) IV Continuous <Continuous>  dextrose 5%. 1000 milliLiter(s) (100 mL/Hr) IV Continuous <Continuous>  dextrose 50% Injectable 25 Gram(s) IV Push once  dextrose 50% Injectable 12.5 Gram(s) IV Push once  dextrose 50% Injectable 25 Gram(s) IV Push once  glucagon  Injectable 1 milliGRAM(s) IntraMuscular once  heparin   Injectable 5000 Unit(s) SubCutaneous every 8 hours  insulin lispro (ADMELOG) corrective regimen sliding scale   SubCutaneous three times a day before meals  insulin lispro (ADMELOG) corrective regimen sliding scale   SubCutaneous at bedtime  sodium chloride 0.45%. 1000 milliLiter(s) (1000 mL/Hr) IV Continuous <Continuous>    MEDICATIONS  (PRN):  acetaminophen   Tablet .. 650 milliGRAM(s) Oral every 6 hours PRN Mild Pain (1 - 3)      Vital Signs Last 24 Hrs  T(C): 36.8 (2021 09:13), Max: 37.7 (2021 01:29)  T(F): 98.3 (2021 09:13), Max: 99.9 (2021 01:29)  HR: 99 (2021 09:13) (90 - 107)  BP: 146/97 (2021 09:13) (138/95 - 157/105)  BP(mean): --  RR: 18 (2021 09:13) (16 - 18)  SpO2: 98% (2021 09:13) (97% - 100%)  CAPILLARY BLOOD GLUCOSE      POCT Blood Glucose.: 108 mg/dL (2021 07:35)  POCT Blood Glucose.: 120 mg/dL (2021 23:09)  POCT Blood Glucose.: 105 mg/dL (2021 17:19)  POCT Blood Glucose.: 152 mg/dL (2021 12:11)    I&O's Summary    2021 07:01  -  2021 07:00  --------------------------------------------------------  IN: 240 mL / OUT: 8450 mL / NET: -8210 mL        PHYSICAL EXAM:  GENERAL: NAD, well-developed  HEAD:  Atraumatic, Normocephalic  EYES: EOMI, PERRLA, conjunctiva and sclera clear  NECK: Supple, No JVD  CHEST/LUNG: Clear to auscultation bilaterally; No wheeze  HEART: Regular rate and rhythm; No murmurs, rubs, or gallops  ABDOMEN: Soft, Nontender, Nondistended; Bowel sounds present  EXTREMITIES:  2+ Peripheral Pulses, No clubbing, cyanosis, or edema  PSYCH: AAOx3  NEUROLOGY: non-focal  SKIN: No rashes or lesions    LABS:                        11.2   12.87 )-----------( 302      ( 2021 22:27 )             34.5     -    138  |  103  |  60<H>  ----------------------------<  137<H>  4.5   |  20<L>  |  4.85<H>    Ca    9.8      2021 04:13  Phos  5.5       Mg     2.2         TPro  8.0  /  Alb  3.8  /  TBili  0.8  /  DBili  x   /  AST  8   /  ALT  15  /  AlkPhos  123<H>            Urinalysis Basic - ( 2021 22:27 )    Color: Light Orange / Appearance: Slightly Turbid / S.008 / pH: x  Gluc: x / Ketone: Negative  / Bili: Negative / Urobili: <2 mg/dL   Blood: x / Protein: 100 mg/dL / Nitrite: Negative   Leuk Esterase: Moderate / RBC: 6-10 /HPF / WBC 25-50 /HPF   Sq Epi: x / Non Sq Epi: x / Bacteria: Moderate        RADIOLOGY & ADDITIONAL TESTS:    Imaging Personally Reviewed:    Consultant(s) Notes Reviewed:      Care Discussed with Consultants/Other Providers:

## 2021-01-18 NOTE — PROGRESS NOTE ADULT - ASSESSMENT
Calling regarding: Pt requested a call back re obtaining US results from 9/26/19. Please advise.               Caller: Pt    Timeframe for callback: Today      Pharmacy information verified:  No     Phone number to be reached at:  [facility]: [phone]          36 yo M h/o DM admitted w/ RAMIREZ (Cr 10.4)secondary to bilateral obstructing ureteral calculi 1/17/2021 taken urgently to the OR for bilateral ureteral stent placement, in POD postop    POD #1 pt's pain controlled, still with POD, Cr to 4.85 this AM    -f/u BMPs  -1L fluid bolus  -encourage fluid po  -f/u cultures  -continue CTX  -f/u medicine  -f/u nephrology  -continue amlodipine, if BP remains elevated will increase to 10mg  -continue heart until Cr nadirs  -DVT prophy, IS, OOB, ambulate

## 2021-01-18 NOTE — CONSULT NOTE ADULT - PROBLEM SELECTOR RECOMMENDATION 9
Pt. with RAMIREZ in the setting of obstructive uropathy, pyelonephritis, and ACE-i therapy. However, exact duration of renal failure unknown. No prior labs available for review. On arrival, Scr was 10.39. Pt. found to have bilateral hydronephrosis with obstructing stones. Underwent bilateral ureteral stent placement by urology team. Scr has improved to 4.85 today. Recommend to repeat UA and Check spot urine TP/Cr ratio once urine starts clearing. Pt. with evidence of POD with 8.5L UOP. Pt. received 1L 1/2 NS fluid bolus today. Recommend starting NS @ 125ml/hour for 24 hours. Hold ACE-i for now. Monitor labs and urine output. Avoid potnntial nephrotoxins. Dose medications as per eGFR
S/P B/L ureteral stents in OR.  -management as per   -Pain control  -Bowel regimen  -OOB and ambulate  -Incentive spirometer  -DVT prophylaxis

## 2021-01-18 NOTE — CONSULT NOTE ADULT - ASSESSMENT
Pt. with non-oliguric RAMIREZ in the setting of obstructive uropathy
35 year old male with hx of HTN, DM, HLD and kidney stones admitted with b/l obstructive uropathy. S/P B/L ureteral stents in OR.

## 2021-01-18 NOTE — PROGRESS NOTE ADULT - PROBLEM SELECTOR PLAN 1
S/P OR with B/L ureteral stents.  -Post op care as per   -Pain control  -Bowel regimen  -OOB and ambulate  -DVT prophylaxis

## 2021-01-18 NOTE — PROGRESS NOTE ADULT - ASSESSMENT
35 year old male with hx of HTN, DM, HLD and kidney stones admitted with b/l obstructive uropathy. S/P B/L ureteral stents in OR.

## 2021-01-18 NOTE — PROGRESS NOTE ADULT - SUBJECTIVE AND OBJECTIVE BOX
Overnight events:  None    Subjective:  Pt offers no complaints    Objective:    Vital signs  T(C): , Max: 37.7 (01-18-21 @ 01:29)  HR: 97 (01-18-21 @ 06:03)  BP: 138/95 (01-18-21 @ 06:03)  SpO2: 98% (01-18-21 @ 06:03)  Wt(kg): --    Output   Sly: 3600 light punch  01-17 @ 07:01  -  01-18 @ 07:00  --------------------------------------------------------  IN: 240 mL / OUT: 8450 mL / NET: -8210 mL        Gen: NAD  Abd: soft, nontender, minimal L CVAT  : sly secured    Labs: q 6hr BMPs                        11.2   12.87 )-----------( 302      ( 16 Jan 2021 22:27 )             34.5     18 Jan 2021 04:13    138    |  103    |  60     ----------------------------<  137    4.5     |  20     |  4.85     Ca    9.8        18 Jan 2021 04:13  Phos  5.5       18 Jan 2021 04:13  Mg     2.2       18 Jan 2021 04:13        Urine Cx: pending  OR Cx: pending

## 2021-01-18 NOTE — PROGRESS NOTE ADULT - PROBLEM SELECTOR PLAN 2
Due to obstructive uropathy, in post obstructive diuresis, Urine output 8L in 24 H  -Rec aggressive IVF hydration to match urine output  -Monitor serial BUN/Creat   -Avoid nephrotoxic agents

## 2021-01-18 NOTE — CONSULT NOTE ADULT - PROBLEM SELECTOR RECOMMENDATION 2
BP slightly above target range. ACE-i therapy on hold in setting of renal failure/RAMIREZ. Continue Amlodipine 5 mg. Low salt diet. Monitor BP.    If you have any questions, please feel free to contact me  David Rodriguez  Nephrology Fellow  [phone]  (After 5pm or on weekends please page the on-call fellow)

## 2021-01-19 ENCOUNTER — TRANSCRIPTION ENCOUNTER (OUTPATIENT)
Age: 36
End: 2021-01-19

## 2021-01-19 VITALS
TEMPERATURE: 98 F | DIASTOLIC BLOOD PRESSURE: 95 MMHG | OXYGEN SATURATION: 98 % | SYSTOLIC BLOOD PRESSURE: 141 MMHG | HEART RATE: 108 BPM | RESPIRATION RATE: 18 BRPM

## 2021-01-19 LAB
ANION GAP SERPL CALC-SCNC: 15 MMOL/L — HIGH (ref 7–14)
APPEARANCE UR: ABNORMAL
BILIRUB UR-MCNC: NEGATIVE — SIGNIFICANT CHANGE UP
BUN SERPL-MCNC: 39 MG/DL — HIGH (ref 7–23)
CALCIUM SERPL-MCNC: 9.8 MG/DL — SIGNIFICANT CHANGE UP (ref 8.4–10.5)
CHLORIDE SERPL-SCNC: 104 MMOL/L — SIGNIFICANT CHANGE UP (ref 98–107)
CO2 SERPL-SCNC: 20 MMOL/L — LOW (ref 22–31)
COLOR SPEC: ABNORMAL
CREAT ?TM UR-MCNC: 44 MG/DL — SIGNIFICANT CHANGE UP
CREAT SERPL-MCNC: 2.69 MG/DL — HIGH (ref 0.5–1.3)
CULTURE RESULTS: NO GROWTH — SIGNIFICANT CHANGE UP
CULTURE RESULTS: SIGNIFICANT CHANGE UP
DIFF PNL FLD: ABNORMAL
GLUCOSE BLDC GLUCOMTR-MCNC: 104 MG/DL — HIGH (ref 70–99)
GLUCOSE BLDC GLUCOMTR-MCNC: 156 MG/DL — HIGH (ref 70–99)
GLUCOSE SERPL-MCNC: 104 MG/DL — HIGH (ref 70–99)
GLUCOSE UR QL: NEGATIVE — SIGNIFICANT CHANGE UP
KETONES UR-MCNC: ABNORMAL
LEUKOCYTE ESTERASE UR-ACNC: NEGATIVE — SIGNIFICANT CHANGE UP
MAGNESIUM SERPL-MCNC: 1.8 MG/DL — SIGNIFICANT CHANGE UP (ref 1.6–2.6)
NITRITE UR-MCNC: NEGATIVE — SIGNIFICANT CHANGE UP
PH UR: 6.5 — SIGNIFICANT CHANGE UP (ref 5–8)
PHOSPHATE SERPL-MCNC: 4.6 MG/DL — HIGH (ref 2.5–4.5)
POTASSIUM SERPL-MCNC: 4.5 MMOL/L — SIGNIFICANT CHANGE UP (ref 3.5–5.3)
POTASSIUM SERPL-SCNC: 4.5 MMOL/L — SIGNIFICANT CHANGE UP (ref 3.5–5.3)
PROT ?TM UR-MCNC: 254 MG/DL — SIGNIFICANT CHANGE UP
PROT UR-MCNC: ABNORMAL
SODIUM SERPL-SCNC: 139 MMOL/L — SIGNIFICANT CHANGE UP (ref 135–145)
SP GR SPEC: 1.01 — SIGNIFICANT CHANGE UP (ref 1.01–1.02)
SPECIMEN SOURCE: SIGNIFICANT CHANGE UP
SPECIMEN SOURCE: SIGNIFICANT CHANGE UP
UROBILINOGEN FLD QL: SIGNIFICANT CHANGE UP

## 2021-01-19 PROCEDURE — 99233 SBSQ HOSP IP/OBS HIGH 50: CPT

## 2021-01-19 PROCEDURE — 99232 SBSQ HOSP IP/OBS MODERATE 35: CPT | Mod: GC

## 2021-01-19 RX ORDER — AMLODIPINE BESYLATE 2.5 MG/1
1 TABLET ORAL
Qty: 30 | Refills: 0
Start: 2021-01-19 | End: 2021-02-17

## 2021-01-19 RX ORDER — ACETAMINOPHEN 500 MG
2 TABLET ORAL
Qty: 0 | Refills: 0 | DISCHARGE
Start: 2021-01-19

## 2021-01-19 RX ORDER — AMLODIPINE BESYLATE 2.5 MG/1
1 TABLET ORAL
Qty: 0 | Refills: 0 | DISCHARGE
Start: 2021-01-19

## 2021-01-19 RX ORDER — LISINOPRIL 2.5 MG/1
1 TABLET ORAL
Qty: 0 | Refills: 0 | DISCHARGE

## 2021-01-19 RX ADMIN — Medication 1: at 12:19

## 2021-01-19 RX ADMIN — AMLODIPINE BESYLATE 10 MILLIGRAM(S): 2.5 TABLET ORAL at 05:58

## 2021-01-19 RX ADMIN — HEPARIN SODIUM 5000 UNIT(S): 5000 INJECTION INTRAVENOUS; SUBCUTANEOUS at 05:58

## 2021-01-19 RX ADMIN — CEFTRIAXONE 100 MILLIGRAM(S): 500 INJECTION, POWDER, FOR SOLUTION INTRAMUSCULAR; INTRAVENOUS at 02:53

## 2021-01-19 NOTE — DISCHARGE NOTE NURSING/CASE MANAGEMENT/SOCIAL WORK - NSDCPNINST_GEN_ALL_CORE
Make a follow up appointment with Dr. Sandhu. Call him if you develop a fever, or if you have difficulty urinating or have bloody urine. Your A1C= 7.0. Continue to follow a consistent carbohydrate diet and take your medications for diabetes. Make a follow up appointment with Dr. Sandhu. Call him if you develop a fever, or if you have difficulty urinating or have bloody urine. Your A1C= 7.0. Continue to follow a consistent carbohydrate diet and follow up with your primary physician for better diabetes management.

## 2021-01-19 NOTE — PROGRESS NOTE ADULT - ASSESSMENT
36 yo M h/o DM admitted w/ RAMIREZ (Cr 10.4)secondary to bilateral obstructing ureteral calculi 1/17/2021 taken urgently to the OR for bilateral ureteral stent placement, in POD postop    POD #1 pt's pain controlled, still with POD, Cr to 4.85 this AM  #2 - urine output deceasing; creat improved to 2.7  Plan:  - d/c heart  - BMP q 12  -encourage fluid po  -f/u cultures  -continue CTX  -f/u medicine  -f/u nephrology  -continue amlodipine, if BP remains elevated will increase to 10mg     36 yo M h/o DM admitted w/ RAMIREZ (Cr 10.4)secondary to bilateral obstructing ureteral calculi 1/17/2021 taken urgently to the OR for bilateral ureteral stent placement, in POD postop    POD #1 pt's pain controlled, still with POD, Cr to 4.85 this AM  #2 - urine output deceasing; creat improved to 2.7  Plan:  - d/c heart  - BMP q 12  -encourage fluid po  -f/u cultures  -continue Ceftriaxone  -f/u medicine  -f/u nephrology  -continue amlodipine, if BP remains elevated will increase to 10mg

## 2021-01-19 NOTE — PROGRESS NOTE ADULT - SUBJECTIVE AND OBJECTIVE BOX
[facility] DIVISION OF KIDNEY DISEASES AND HYPERTENSION -- FOLLOW UP NOTE  --------------------------------------------------------------------------------  Chief Complaint:    24 hour events/subjective:        PAST HISTORY  --------------------------------------------------------------------------------  No significant changes to PMH, PSH, FHx, SHx, unless otherwise noted    ALLERGIES & MEDICATIONS  --------------------------------------------------------------------------------  Allergies    No Known Allergies    Intolerances      Standing Inpatient Medications  amLODIPine   Tablet 10 milliGRAM(s) Oral daily  cefTRIAXone   IVPB 1000 milliGRAM(s) IV Intermittent every 24 hours  dextrose 40% Gel 15 Gram(s) Oral once  dextrose 5%. 1000 milliLiter(s) IV Continuous <Continuous>  dextrose 5%. 1000 milliLiter(s) IV Continuous <Continuous>  dextrose 50% Injectable 25 Gram(s) IV Push once  dextrose 50% Injectable 12.5 Gram(s) IV Push once  dextrose 50% Injectable 25 Gram(s) IV Push once  glucagon  Injectable 1 milliGRAM(s) IntraMuscular once  heparin   Injectable 5000 Unit(s) SubCutaneous every 8 hours  insulin lispro (ADMELOG) corrective regimen sliding scale   SubCutaneous three times a day before meals  insulin lispro (ADMELOG) corrective regimen sliding scale   SubCutaneous at bedtime  sodium chloride 0.9%. 1000 milliLiter(s) IV Continuous <Continuous>    PRN Inpatient Medications  acetaminophen   Tablet .. 650 milliGRAM(s) Oral every 6 hours PRN      REVIEW OF SYSTEMS  --------------------------------------------------------------------------------  Gen: No fevers/chills  Skin: No rashes  Head/Eyes/Ears: Normal hearing,   Respiratory: No dyspnea, cough  CV: No chest pain  GI: No abdominal pain, diarrhea  : No dysuria, hematuria  MSK: No  edema  Heme: No easy bruising or bleeding  Psych: No significant depression      All other systems were reviewed and are negative, except as noted.    VITALS/PHYSICAL EXAM  --------------------------------------------------------------------------------  T(C): 36.9 (01-19-21 @ 10:29), Max: 37.6 (01-19-21 @ 02:23)  HR: 108 (01-19-21 @ 10:29) (98 - 108)  BP: 141/95 (01-19-21 @ 10:29) (132/82 - 154/93)  RR: 18 (01-19-21 @ 10:29) (16 - 18)  SpO2: 98% (01-19-21 @ 10:29) (97% - 98%)  Wt(kg): --        01-18-21 @ 07:01  -  01-19-21 @ 07:00  --------------------------------------------------------  IN: 1430 mL / OUT: 6750 mL / NET: -5320 mL    01-19-21 @ 07:01  -  01-19-21 @ 10:54  --------------------------------------------------------  IN: 0 mL / OUT: 275 mL / NET: -275 mL        Physical Exam:  	Gen: NAD  	HEENT: MMM  	Pulm: CTA B/L  	CV: S1S2  	Abd: Soft, +BS   	Ext: No LE edema B/L  	Neuro: Awake  	Skin: Warm and dry  	Vascular access:      LABS/STUDIES  --------------------------------------------------------------------------------    139  |  104  |  39  ----------------------------<  104      [01-19-21 @ 01:29]  4.5   |  20  |  2.69        Ca     9.8     [01-19-21 @ 01:29]      Mg     1.8     [01-19-21 @ 01:29]      Phos  4.6     [01-19-21 @ 01:19]      Creatinine Trend:  SCr 2.69 [01-19 @ 01:29]  SCr 3.25 [01-18 @ 16:37]  SCr 4.02 [01-18 @ 10:56]  SCr 4.85 [01-18 @ 04:13]  SCr 6.01 [01-17 @ 22:43]    Urinalysis - [01-16-21 @ 22:27]      Color Light Orange / Appearance Slightly Turbid / SG 1.008 / pH 6.0      Gluc Negative / Ketone Negative  / Bili Negative / Urobili <2 mg/dL       Blood Moderate / Protein 100 mg/dL / Leuk Est Moderate / Nitrite Negative      RBC 6-10 / WBC 25-50 / Hyaline  / Gran  / Sq Epi  / Non Sq Epi  / Bacteria Moderate    Urine Creatinine 44      [01-19-21 @ 09:43]  Urine Protein 254      [01-19-21 @ 09:43]         [facility] DIVISION OF KIDNEY DISEASES AND HYPERTENSION -- FOLLOW UP NOTE  --------------------------------------------------------------------------------  HPI: 35-year-old male with DM, HTN, and history of kidney stones was admitted to [facility] on 1/17/21 for bilateral obstructing kidney stones. Pt. presented to the ER complaining of flank pain ~ 2 weeks. CT Abdomen in the ER showed bilateral hydronephrosis with obstructing stones and pyelonephritis. Pt. underwent bilateral ureteral stent placement by urology team on 1/17/21. Nephrology team consulted for elevated serum creatinine. Of note, pt. on ACE-i therapy for BP management. No prior labs available for review. On arrival, Scr was 10.39. Scr has improved to 2.69 today.   Pt. was seen and examined at bedside today. Offers no complaints.   PAST HISTORY  --------------------------------------------------------------------------------  No significant changes to PMH, PSH, FHx, SHx, unless otherwise noted    ALLERGIES & MEDICATIONS  --------------------------------------------------------------------------------  Allergies    No Known Allergies    Intolerances      Standing Inpatient Medications  amLODIPine   Tablet 10 milliGRAM(s) Oral daily  cefTRIAXone   IVPB 1000 milliGRAM(s) IV Intermittent every 24 hours  glucagon  Injectable 1 milliGRAM(s) IntraMuscular once  heparin   Injectable 5000 Unit(s) SubCutaneous every 8 hours  insulin lispro (ADMELOG) corrective regimen sliding scale   SubCutaneous three times a day before meals  insulin lispro (ADMELOG) corrective regimen sliding scale   SubCutaneous at bedtime  sodium chloride 0.9%. 1000 milliLiter(s) IV Continuous <Continuous>    REVIEW OF SYSTEMS  --------------------------------------------------------------------------------  Gen: no lethargy  Respiratory: No dyspnea  CV: No chest pain  GI: + flank pain, now resolved  MSK: no LE edema  Neuro: No dizziness  Heme: No bleeding    All other systems were reviewed and are negative, except as noted.    VITALS/PHYSICAL EXAM  --------------------------------------------------------------------------------  T(C): 36.9 (01-19-21 @ 10:29), Max: 37.6 (01-19-21 @ 02:23)  HR: 108 (01-19-21 @ 10:29) (98 - 108)  BP: 141/95 (01-19-21 @ 10:29) (132/82 - 154/93)  RR: 18 (01-19-21 @ 10:29) (16 - 18)  SpO2: 98% (01-19-21 @ 10:29) (97% - 98%)  Wt(kg): --    01-18-21 @ 07:01  -  01-19-21 @ 07:00  --------------------------------------------------------  IN: 1430 mL / OUT: 6750 mL / NET: -5320 mL    01-19-21 @ 07:01  -  01-19-21 @ 10:54  --------------------------------------------------------  IN: 0 mL / OUT: 275 mL / NET: -275 mL    Physical Exam:  	Gen: NAD  	HEENT: MMM  	Pulm: good air entry B/L  	CV: S1S2  	Abd: Soft, +BS   	Ext: No LE edema B/L  	Neuro: Awake  	Skin: Warm and dry  	  LABS/STUDIES  --------------------------------------------------------------------------------    139  |  104  |  39  ----------------------------<  104      [01-19-21 @ 01:29]  4.5   |  20  |  2.69        Ca     9.8     [01-19-21 @ 01:29]      Mg     1.8     [01-19-21 @ 01:29]      Phos  4.6     [01-19-21 @ 01:19]    Creatinine Trend:  SCr 2.69 [01-19 @ 01:29]  SCr 3.25 [01-18 @ 16:37]  SCr 4.02 [01-18 @ 10:56]  SCr 4.85 [01-18 @ 04:13]  SCr 6.01 [01-17 @ 22:43]   [facility] DIVISION OF KIDNEY DISEASES AND HYPERTENSION -- FOLLOW UP NOTE  --------------------------------------------------------------------------------  HPI: 35-year-old male with DM, HTN, and history of kidney stones was admitted to [facility] on 1/17/21 for bilateral obstructing kidney stones. Pt. presented to the ER complaining of flank pain ~2 weeks. CT Abdomen in the ER showed bilateral hydronephrosis with obstructing stones and pyelonephritis. Pt. underwent bilateral ureteral stent placement by urology team on 1/17/21. Nephrology team consulted for elevated serum creatinine. Of note, pt. on ACE-I therapy for BP management. No prior labs available for review. On arrival, Scr was 10.39. Scr has improved to 2.69 today.   Pt. was seen and examined at bedside today. Offers no complaints.   PAST HISTORY  --------------------------------------------------------------------------------  No significant changes to PMH, PSH, FHx, SHx, unless otherwise noted    ALLERGIES & MEDICATIONS  --------------------------------------------------------------------------------  Allergies    No Known Allergies    Intolerances    Standing Inpatient Medications  amLODIPine   Tablet 10 milliGRAM(s) Oral daily  cefTRIAXone   IVPB 1000 milliGRAM(s) IV Intermittent every 24 hours  glucagon  Injectable 1 milliGRAM(s) IntraMuscular once  heparin   Injectable 5000 Unit(s) SubCutaneous every 8 hours  insulin lispro (ADMELOG) corrective regimen sliding scale   SubCutaneous three times a day before meals  insulin lispro (ADMELOG) corrective regimen sliding scale   SubCutaneous at bedtime  sodium chloride 0.9%. 1000 milliLiter(s) IV Continuous <Continuous>    REVIEW OF SYSTEMS  --------------------------------------------------------------------------------  Gen: no lethargy  Respiratory: No dyspnea  CV: No chest pain  GI: + flank pain, now resolved  MSK: no LE edema  Neuro: No dizziness  Heme: No bleeding    All other systems were reviewed and are negative, except as noted.    VITALS/PHYSICAL EXAM  --------------------------------------------------------------------------------  T(C): 36.9 (01-19-21 @ 10:29), Max: 37.6 (01-19-21 @ 02:23)  HR: 108 (01-19-21 @ 10:29) (98 - 108)  BP: 141/95 (01-19-21 @ 10:29) (132/82 - 154/93)  RR: 18 (01-19-21 @ 10:29) (16 - 18)  SpO2: 98% (01-19-21 @ 10:29) (97% - 98%)  Wt(kg): --    01-18-21 @ 07:01  -  01-19-21 @ 07:00  --------------------------------------------------------  IN: 1430 mL / OUT: 6750 mL / NET: -5320 mL    01-19-21 @ 07:01  -  01-19-21 @ 10:54  --------------------------------------------------------  IN: 0 mL / OUT: 275 mL / NET: -275 mL    Physical Exam:  	Gen: NAD  	HEENT: MMM  	Pulm: good air entry B/L  	CV: S1S2  	Abd: Soft, +BS   	Ext: No LE edema B/L  	Neuro: Awake  	Skin: Warm and dry  	  LABS/STUDIES  --------------------------------------------------------------------------------    139  |  104  |  39  ----------------------------<  104      [01-19-21 @ 01:29]  4.5   |  20  |  2.69        Ca     9.8     [01-19-21 @ 01:29]      Mg     1.8     [01-19-21 @ 01:29]      Phos  4.6     [01-19-21 @ 01:19]    Creatinine Trend:  SCr 2.69 [01-19 @ 01:29]  SCr 3.25 [01-18 @ 16:37]  SCr 4.02 [01-18 @ 10:56]  SCr 4.85 [01-18 @ 04:13]  SCr 6.01 [01-17 @ 22:43]

## 2021-01-19 NOTE — DISCHARGE NOTE NURSING/CASE MANAGEMENT/SOCIAL WORK - PATIENT PORTAL LINK FT
You can access the FollowMyHealth Patient Portal offered by [facility] by registering at the following website: [email or web address]. By joining [employer]’s FollowMyHealth portal, you will also be able to view your health information using other applications (apps) compatible with our system.

## 2021-01-19 NOTE — PROGRESS NOTE ADULT - PROBLEM SELECTOR PLAN 2
BP at target range. Monitor BP on current BP medication. Low salt diet.     On discharge, pt. will need follow up appointment with Dr. Wright ([facility]) - [phone]  If any questions, please feel free to contact me  Marquis Dinero   Nephrology Fellow  [phone]  (After 5 pm or on weekends please page the on-call fellow) BP stable this morning. Monitor BP on current BP medication. Low salt diet.     Pt. advised to follow-up with nephrologist Dr. Amalia Wright ([facility], [phone]) upon discharge.     If any questions, please feel free to contact me  Marquis Dinero   Nephrology Fellow  [phone]  (After 5 pm or on weekends please page the on-call fellow)

## 2021-01-19 NOTE — DISCHARGE NOTE PROVIDER - CARE PROVIDERS DIRECT ADDRESSES
[email or web address]

## 2021-01-19 NOTE — PROGRESS NOTE ADULT - PROBLEM SELECTOR PLAN 1
Pt. with RAMIREZ in the setting of obstructive uropathy, pyelonephritis, and ACE-i therapy. However, exact duration of renal failure unknown. No prior labs available for review. On arrival, Scr was 10.39. Pt. found to have bilateral hydronephrosis with obstructing stones. Underwent bilateral ureteral stent placement by urology team on 1/17/21. Scr has improved to 2.69 today. Hold ACE-i. Encourage PO intake. Monitor labs and urine output. Avoid potential nephrotoxins. Dose medications as per eGFR. Pt. with RAMIREZ in the setting of obstructive uropathy, pyelonephritis, and ACE-I therapy. However, exact duration of renal failure unknown. No previous labs available for review. On arrival, Scr was 10.39. Pt. found to have bilateral hydronephrosis with obstructing stones. Underwent bilateral ureteral stent placement by urology team on 1/17/21. Scr has improved to 2.69 today. Continue to hold ACE-I. Encourage PO intake. Monitor labs and urine output. Avoid potential nephrotoxins. Dose medications as per eGFR.

## 2021-01-19 NOTE — DISCHARGE NOTE PROVIDER - NSDCCPCAREPLAN_GEN_ALL_CORE_FT
PRINCIPAL DISCHARGE DIAGNOSIS  Diagnosis: Stones, urinary tract  Assessment and Plan of Treatment: Do not take lisinopril until you are seen by nephrology doctors; if you need to check blood pressure meds see your PCP  Make appt with urology to make plans for removing stones and stents      SECONDARY DISCHARGE DIAGNOSES  Diagnosis: Obstructive uropathy  Assessment and Plan of Treatment: Obstructive uropathy

## 2021-01-19 NOTE — DISCHARGE NOTE NURSING/CASE MANAGEMENT/SOCIAL WORK - NSDPDISTO_GEN_ALL_CORE
Pt. is afebrile and offers no complaints. In no acute distress. Pt is ambulating ad brian, tolerating diet well and voiding in adequate amounts./Home

## 2021-01-19 NOTE — DISCHARGE NOTE PROVIDER - NSDCMRMEDTOKEN_GEN_ALL_CORE_FT
acetaminophen 325 mg oral tablet: 2 tab(s) orally every 6 hours, As needed, Mild Pain (1 - 3)  amLODIPine 10 mg oral tablet: 1 tab(s) orally once a day   acetaminophen 325 mg oral tablet: 2 tab(s) orally every 6 hours, As needed, Mild Pain (1 - 3)  amLODIPine 10 mg oral tablet: 1 tab(s) orally once a day  amLODIPine 10 mg oral tablet: 1 tab(s) orally once a day

## 2021-01-19 NOTE — DISCHARGE NOTE PROVIDER - CARE PROVIDER_API CALL
Derick Sandhu)  Urology  [address] [facility], [place] [address]  Phone: [phone]  Fax: [phone]  Follow Up Time:     Amalia Wright)  Internal Medicine; Nephrology  [address] [place] [address]  Phone: [phone]  Fax: [phone]  Follow Up Time:

## 2021-01-19 NOTE — PROGRESS NOTE ADULT - SUBJECTIVE AND OBJECTIVE BOX
Patient is a 35y old  Male who presents with a chief complaint of David. uret stones (2021 12:18)      SUBJECTIVE / OVERNIGHT EVENTS: No acute events overnight. Pt has no complaints     ADDITIONAL REVIEW OF SYSTEMS:    MEDICATIONS  (STANDING):  amLODIPine   Tablet 10 milliGRAM(s) Oral daily  cefTRIAXone   IVPB 1000 milliGRAM(s) IV Intermittent every 24 hours  dextrose 40% Gel 15 Gram(s) Oral once  dextrose 5%. 1000 milliLiter(s) (50 mL/Hr) IV Continuous <Continuous>  dextrose 5%. 1000 milliLiter(s) (100 mL/Hr) IV Continuous <Continuous>  dextrose 50% Injectable 25 Gram(s) IV Push once  dextrose 50% Injectable 12.5 Gram(s) IV Push once  dextrose 50% Injectable 25 Gram(s) IV Push once  glucagon  Injectable 1 milliGRAM(s) IntraMuscular once  heparin   Injectable 5000 Unit(s) SubCutaneous every 8 hours  insulin lispro (ADMELOG) corrective regimen sliding scale   SubCutaneous three times a day before meals  insulin lispro (ADMELOG) corrective regimen sliding scale   SubCutaneous at bedtime  sodium chloride 0.9%. 1000 milliLiter(s) (125 mL/Hr) IV Continuous <Continuous>    MEDICATIONS  (PRN):  acetaminophen   Tablet .. 650 milliGRAM(s) Oral every 6 hours PRN Mild Pain (1 - 3)      CAPILLARY BLOOD GLUCOSE      POCT Blood Glucose.: 156 mg/dL (2021 11:51)  POCT Blood Glucose.: 104 mg/dL (2021 07:13)  POCT Blood Glucose.: 99 mg/dL (2021 21:42)  POCT Blood Glucose.: 93 mg/dL (2021 17:14)    I&O's Summary    2021 07:01  -  2021 07:00  --------------------------------------------------------  IN: 1430 mL / OUT: 6750 mL / NET: -5320 mL    2021 07:01  -  2021 12:42  --------------------------------------------------------  IN: 0 mL / OUT: 275 mL / NET: -275 mL        PHYSICAL EXAM:  Vital Signs Last 24 Hrs  T(C): 36.9 (2021 10:29), Max: 37.6 (2021 02:23)  T(F): 98.5 (2021 10:29), Max: 99.7 (2021 02:23)  HR: 108 (2021 10:29) (98 - 108)  BP: 141/95 (2021 10:29) (132/82 - 154/93)  BP(mean): --  RR: 18 (2021 10:29) (16 - 18)  SpO2: 98% (2021 10:29) (97% - 98%)    GENERAL: NAD, well-developed  HEAD:  Atraumatic, Normocephalic  EYES: EOMI, PERRLA, conjunctiva and sclera clear  NECK: Supple, No JVD  CHEST/LUNG: Clear to auscultation bilaterally; No wheeze  HEART: Regular rate and rhythm; No murmurs, rubs, or gallops  ABDOMEN: Soft, Nontender, Nondistended; Bowel sounds present  EXTREMITIES:  2+ Peripheral Pulses, No clubbing, cyanosis, or edema  PSYCH: AAOx3  NEUROLOGY: non-focal  SKIN: No rashes or lesions    LABS:        139  |  104  |  39<H>  ----------------------------<  104<H>  4.5   |  20<L>  |  2.69<H>    Ca    9.8      2021 01:29  Phos  4.6       Mg     1.8                 Urinalysis Basic - ( 2021 09:43 )    Color: Light Brown / Appearance: Slightly Turbid / S.012 / pH: x  Gluc: x / Ketone: Trace  / Bili: Negative / Urobili: <2 mg/dL   Blood: x / Protein: 100 mg/dL / Nitrite: Negative   Leuk Esterase: Negative / RBC: >720 /HPF / WBC 8 /HPF   Sq Epi: x / Non Sq Epi: 2 /HPF / Bacteria: Negative        Culture - Urine (collected 2021 16:20)  Source: .Urine RIGHT KIDNEY URINE  Preliminary Report (2021 16:28):    No growth    Culture - Urine (collected 2021 16:20)  Source: .Urine left kidney urine  Preliminary Report (2021 16:28):    No growth    Culture - Urine (collected 2021 01:55)  Source: .Urine Clean Catch (Midstream)  Final Report (2021 08:54):    <10,000 CFU/mL Normal Urogenital Zonia        RADIOLOGY & ADDITIONAL TESTS:  Results Reviewed:   Imaging Personally Reviewed:  Electrocardiogram Personally Reviewed:    COORDINATION OF CARE:  Care Discussed with Consultants/Other Providers [Y/N]:  Prior or Outpatient Records Reviewed [Y/N]:

## 2021-01-19 NOTE — DISCHARGE NOTE PROVIDER - HOSPITAL COURSE
Pt admitted with charito uret stones and creat of 10; taken to OR for bilateral stents; seen by nephrology; also had some post-obst diuresis; creat now down to 2.6 and voiding well in lesser amts; home today and f/u in office with urology and nephrology.

## 2021-01-20 PROBLEM — I10 ESSENTIAL (PRIMARY) HYPERTENSION: Chronic | Status: ACTIVE | Noted: 2021-01-16

## 2021-01-20 PROBLEM — Z00.00 ENCOUNTER FOR PREVENTIVE HEALTH EXAMINATION: Status: ACTIVE | Noted: 2021-01-20

## 2021-01-25 ENCOUNTER — TRANSCRIPTION ENCOUNTER (OUTPATIENT)
Age: 36
End: 2021-01-25

## 2021-01-27 ENCOUNTER — APPOINTMENT (OUTPATIENT)
Dept: NEPHROLOGY | Facility: CLINIC | Age: 36
End: 2021-01-27
Payer: MEDICAID

## 2021-01-27 VITALS
DIASTOLIC BLOOD PRESSURE: 88 MMHG | SYSTOLIC BLOOD PRESSURE: 136 MMHG | HEIGHT: 68 IN | BODY MASS INDEX: 40.32 KG/M2 | WEIGHT: 266 LBS | HEART RATE: 112 BPM

## 2021-01-27 DIAGNOSIS — I10 ESSENTIAL (PRIMARY) HYPERTENSION: ICD-10-CM

## 2021-01-27 DIAGNOSIS — Z82.49 FAMILY HISTORY OF ISCHEMIC HEART DISEASE AND OTHER DISEASES OF THE CIRCULATORY SYSTEM: ICD-10-CM

## 2021-01-27 DIAGNOSIS — Z78.9 OTHER SPECIFIED HEALTH STATUS: ICD-10-CM

## 2021-01-27 DIAGNOSIS — N13.9 OBSTRUCTIVE AND REFLUX UROPATHY, UNSPECIFIED: ICD-10-CM

## 2021-01-27 DIAGNOSIS — Z83.3 FAMILY HISTORY OF DIABETES MELLITUS: ICD-10-CM

## 2021-01-27 DIAGNOSIS — E78.5 HYPERLIPIDEMIA, UNSPECIFIED: ICD-10-CM

## 2021-01-27 PROCEDURE — 99215 OFFICE O/P EST HI 40 MIN: CPT | Mod: 95

## 2021-01-27 RX ORDER — ATORVASTATIN CALCIUM 40 MG/1
40 TABLET, FILM COATED ORAL
Refills: 0 | Status: ACTIVE | COMMUNITY

## 2021-01-27 NOTE — PHYSICAL EXAM
[General Appearance - Alert] : alert [General Appearance - In No Acute Distress] : in no acute distress [General Appearance - Well Developed] : well developed [General Appearance - Well Nourished] : well nourished [General Appearance - Well-Appearing] : healthy appearing [Sclera] : the sclera and conjunctiva were normal [PERRL With Normal Accommodation] : pupils were equal in size, round, and reactive to light [Extraocular Movements] : extraocular movements were intact [Outer Ear] : the ears and nose were normal in appearance [Oropharynx] : the oropharynx was normal [Neck Appearance] : the appearance of the neck was normal [Exaggerated Use Of Accessory Muscles For Inspiration] : no accessory muscle use [Edema] : there was no peripheral edema [Abnormal Walk] : normal gait [Involuntary Movements] : no involuntary movements were seen [Skin Color & Pigmentation] : normal skin color and pigmentation [Skin Turgor] : normal skin turgor [] : no rash [No Focal Deficits] : no focal deficits [Oriented To Time, Place, And Person] : oriented to person, place, and time [Affect] : the affect was normal [Impaired Insight] : insight and judgment were intact

## 2021-02-01 ENCOUNTER — APPOINTMENT (OUTPATIENT)
Dept: UROLOGY | Facility: CLINIC | Age: 36
End: 2021-02-01

## 2021-02-03 ENCOUNTER — APPOINTMENT (OUTPATIENT)
Dept: UROLOGY | Facility: CLINIC | Age: 36
End: 2021-02-03
Payer: MEDICAID

## 2021-02-03 VITALS
SYSTOLIC BLOOD PRESSURE: 128 MMHG | RESPIRATION RATE: 17 BRPM | DIASTOLIC BLOOD PRESSURE: 87 MMHG | HEIGHT: 68 IN | HEART RATE: 103 BPM | BODY MASS INDEX: 40.32 KG/M2 | TEMPERATURE: 98 F | WEIGHT: 266 LBS

## 2021-02-03 DIAGNOSIS — E66.01 MORBID (SEVERE) OBESITY DUE TO EXCESS CALORIES: ICD-10-CM

## 2021-02-03 DIAGNOSIS — E11.9 TYPE 2 DIABETES MELLITUS W/OUT COMPLICATIONS: ICD-10-CM

## 2021-02-03 PROCEDURE — 99214 OFFICE O/P EST MOD 30 MIN: CPT

## 2021-02-03 PROCEDURE — 99072 ADDL SUPL MATRL&STAF TM PHE: CPT

## 2021-02-03 NOTE — REVIEW OF SYSTEMS
[Negative] : Heme/Lymph [Recent Weight Gain (___ Lbs)] : recent [unfilled] ~Ulb weight gain [Recent Weight Loss (___ Lbs)] : recent [unfilled] ~Ulb weight loss [see HPI] : see HPI [Loss of interest] : loss of interest in sexual activity [Poor quality erections] : Poor quality erections [Blood in urine that you can see] : blood visible in urine [History of kidney stones] : history of kidney stones [Wake up at night to urinate  How many times?  ___] : wakes up to urinate [unfilled] times during the night

## 2021-02-05 PROBLEM — E11.9 DIABETES MELLITUS, TYPE 2: Status: ACTIVE | Noted: 2021-01-27

## 2021-02-05 PROBLEM — E66.01 OBESITY, CLASS III, BMI 40-49.9 (MORBID OBESITY): Status: ACTIVE | Noted: 2021-01-27

## 2021-02-05 NOTE — HISTORY OF PRESENT ILLNESS
[FreeTextEntry1] : 34y/o man\par RAMIREZ, bilateral ureteral stones\par Admitted to [facility] with RAMIREZ, Cr over 10.\par Required bilateral ureteral stents insertion on 1/17/21, developed post-obstructive diuresis after stent insertion. Cr down to approximately 2.6 upon discharge on 1/19/21.\par \par Here for f/u.\par He has already seen Dr. Wright (Nephrology) for follow up.\par \par CT, labs, hospital records reviewed with patient: Right 9mm x 8mm stone proximal ureter and smaller clusters in mid and lower calyces; Left 10mm x 20mm stone proximal ureter.

## 2021-02-05 NOTE — PHYSICAL EXAM
[General Appearance - Well Developed] : well developed [General Appearance - Well Nourished] : well nourished [Normal Appearance] : normal appearance [Well Groomed] : well groomed [General Appearance - In No Acute Distress] : no acute distress [] : no respiratory distress [Respiration, Rhythm And Depth] : normal respiratory rhythm and effort [Exaggerated Use Of Accessory Muscles For Inspiration] : no accessory muscle use [Abdomen Tenderness] : non-tender [Abdomen Soft] : soft [Costovertebral Angle Tenderness] : no ~M costovertebral angle tenderness [Urinary Bladder Findings] : the bladder was normal on palpation [Normal Station and Gait] : the gait and station were normal for the patient's age [Skin Color & Pigmentation] : normal skin color and pigmentation [Oriented To Time, Place, And Person] : oriented to person, place, and time [Not Anxious] : not anxious

## 2021-02-05 NOTE — ASSESSMENT
[FreeTextEntry1] : Needs better control of his diabetes and weight loss\par Advised increase fluids intake\par \par Will need \par LEFT: LEFT Ureteroscopy laser lithotripsy stent insertion/change; possible LEFT PCNL.\par RIGHT: Right Ureteroscopy laser lithotripsy stent insertion/change.\par \par Potential complications of bleeding, transfusion, selective angioembolization if indicated, adjacent organ injury, fever, sepsis, infection, incomplete stone clearance, bowel or other unusual injury, chest complications, vascular injuries, procedures needed to address any complications, ureteral injury, anesthetic complications, all discussed.\par \par Will schedule surgery for left side first, then right side about 2 weeks later.

## 2021-02-10 ENCOUNTER — NON-APPOINTMENT (OUTPATIENT)
Age: 36
End: 2021-02-10

## 2021-02-16 ENCOUNTER — NON-APPOINTMENT (OUTPATIENT)
Age: 36
End: 2021-02-16

## 2021-02-16 LAB
25(OH)D3 SERPL-MCNC: 13.4 NG/ML
ALBUMIN SERPL ELPH-MCNC: 4.6 G/DL
ANION GAP SERPL CALC-SCNC: 16 MMOL/L
APPEARANCE: ABNORMAL
BACTERIA: NEGATIVE
BASOPHILS # BLD AUTO: 0.08 K/UL
BASOPHILS NFR BLD AUTO: 0.8 %
BILIRUBIN URINE: NEGATIVE
BLOOD URINE: ABNORMAL
BUN SERPL-MCNC: 30 MG/DL
CALCIUM SERPL-MCNC: 10.5 MG/DL
CALCIUM SERPL-MCNC: 10.5 MG/DL
CHLORIDE SERPL-SCNC: 104 MMOL/L
CO2 SERPL-SCNC: 21 MMOL/L
COLOR: ABNORMAL
CREAT SERPL-MCNC: 1.76 MG/DL
CREAT SPEC-SCNC: 70 MG/DL
CREAT/PROT UR: 1.1 RATIO
EOSINOPHIL # BLD AUTO: 0.38 K/UL
EOSINOPHIL NFR BLD AUTO: 3.9 %
GLUCOSE QUALITATIVE U: NEGATIVE
GLUCOSE SERPL-MCNC: 98 MG/DL
HCT VFR BLD CALC: 39.2 %
HGB BLD-MCNC: 11.9 G/DL
HYALINE CASTS: 0 /LPF
IMM GRANULOCYTES NFR BLD AUTO: 0.3 %
KETONES URINE: NEGATIVE
LEUKOCYTE ESTERASE URINE: ABNORMAL
LYMPHOCYTES # BLD AUTO: 2.75 K/UL
LYMPHOCYTES NFR BLD AUTO: 28.3 %
MAN DIFF?: NORMAL
MCHC RBC-ENTMCNC: 25.9 PG
MCHC RBC-ENTMCNC: 30.4 GM/DL
MCV RBC AUTO: 85.2 FL
MICROSCOPIC-UA: NORMAL
MONOCYTES # BLD AUTO: 0.8 K/UL
MONOCYTES NFR BLD AUTO: 8.2 %
NEUTROPHILS # BLD AUTO: 5.68 K/UL
NEUTROPHILS NFR BLD AUTO: 58.5 %
NITRITE URINE: NEGATIVE
PARATHYROID HORMONE INTACT: 22 PG/ML
PH URINE: 6
PHOSPHATE SERPL-MCNC: 4.6 MG/DL
PLATELET # BLD AUTO: 358 K/UL
POTASSIUM SERPL-SCNC: 4.1 MMOL/L
PROT UR-MCNC: 80 MG/DL
PROTEIN URINE: ABNORMAL
RBC # BLD: 4.6 M/UL
RBC # FLD: 13.8 %
RED BLOOD CELLS URINE: >720 /HPF
SODIUM SERPL-SCNC: 141 MMOL/L
SPECIFIC GRAVITY URINE: 1.01
SQUAMOUS EPITHELIAL CELLS: 2 /HPF
UROBILINOGEN URINE: NORMAL
WBC # FLD AUTO: 9.72 K/UL
WHITE BLOOD CELLS URINE: 29 /HPF

## 2021-02-17 ENCOUNTER — OUTPATIENT (OUTPATIENT)
Dept: OUTPATIENT SERVICES | Facility: HOSPITAL | Age: 36
LOS: 1 days | End: 2021-02-17
Payer: MEDICAID

## 2021-02-17 VITALS
RESPIRATION RATE: 14 BRPM | TEMPERATURE: 98 F | WEIGHT: 270.95 LBS | SYSTOLIC BLOOD PRESSURE: 141 MMHG | DIASTOLIC BLOOD PRESSURE: 94 MMHG | HEIGHT: 68 IN | OXYGEN SATURATION: 97 % | HEART RATE: 101 BPM

## 2021-02-17 DIAGNOSIS — E66.01 MORBID (SEVERE) OBESITY DUE TO EXCESS CALORIES: Chronic | ICD-10-CM

## 2021-02-17 DIAGNOSIS — E11.9 TYPE 2 DIABETES MELLITUS WITHOUT COMPLICATIONS: ICD-10-CM

## 2021-02-17 DIAGNOSIS — N20.1 CALCULUS OF URETER: ICD-10-CM

## 2021-02-17 DIAGNOSIS — Z01.818 ENCOUNTER FOR OTHER PREPROCEDURAL EXAMINATION: ICD-10-CM

## 2021-02-17 DIAGNOSIS — N20.0 CALCULUS OF KIDNEY: ICD-10-CM

## 2021-02-17 DIAGNOSIS — Z91.89 OTHER SPECIFIED PERSONAL RISK FACTORS, NOT ELSEWHERE CLASSIFIED: ICD-10-CM

## 2021-02-17 DIAGNOSIS — Z96.0 PRESENCE OF UROGENITAL IMPLANTS: Chronic | ICD-10-CM

## 2021-02-17 DIAGNOSIS — E66.01 MORBID (SEVERE) OBESITY DUE TO EXCESS CALORIES: ICD-10-CM

## 2021-02-17 LAB
A1C WITH ESTIMATED AVERAGE GLUCOSE RESULT: 6.6 % — HIGH (ref 4–5.6)
ANION GAP SERPL CALC-SCNC: 15 MMOL/L — SIGNIFICANT CHANGE UP (ref 5–17)
BLD GP AB SCN SERPL QL: NEGATIVE — SIGNIFICANT CHANGE UP
BUN SERPL-MCNC: 26 MG/DL — HIGH (ref 7–23)
CALCIUM SERPL-MCNC: 10.5 MG/DL — SIGNIFICANT CHANGE UP (ref 8.4–10.5)
CHLORIDE SERPL-SCNC: 101 MMOL/L — SIGNIFICANT CHANGE UP (ref 96–108)
CO2 SERPL-SCNC: 24 MMOL/L — SIGNIFICANT CHANGE UP (ref 22–31)
CREAT SERPL-MCNC: 1.44 MG/DL — HIGH (ref 0.5–1.3)
ESTIMATED AVERAGE GLUCOSE: 143 MG/DL — HIGH (ref 68–114)
GLUCOSE SERPL-MCNC: 129 MG/DL — HIGH (ref 70–99)
HCT VFR BLD CALC: 35.7 % — LOW (ref 39–50)
HGB BLD-MCNC: 11.7 G/DL — LOW (ref 13–17)
MCHC RBC-ENTMCNC: 26.8 PG — LOW (ref 27–34)
MCHC RBC-ENTMCNC: 32.8 GM/DL — SIGNIFICANT CHANGE UP (ref 32–36)
MCV RBC AUTO: 81.9 FL — SIGNIFICANT CHANGE UP (ref 80–100)
NRBC # BLD: 0 /100 WBCS — SIGNIFICANT CHANGE UP (ref 0–0)
PLATELET # BLD AUTO: 326 K/UL — SIGNIFICANT CHANGE UP (ref 150–400)
POTASSIUM SERPL-MCNC: 4.2 MMOL/L — SIGNIFICANT CHANGE UP (ref 3.5–5.3)
POTASSIUM SERPL-SCNC: 4.2 MMOL/L — SIGNIFICANT CHANGE UP (ref 3.5–5.3)
RBC # BLD: 4.36 M/UL — SIGNIFICANT CHANGE UP (ref 4.2–5.8)
RBC # FLD: 13.9 % — SIGNIFICANT CHANGE UP (ref 10.3–14.5)
RH IG SCN BLD-IMP: POSITIVE — SIGNIFICANT CHANGE UP
SODIUM SERPL-SCNC: 140 MMOL/L — SIGNIFICANT CHANGE UP (ref 135–145)
WBC # BLD: 9.63 K/UL — SIGNIFICANT CHANGE UP (ref 3.8–10.5)
WBC # FLD AUTO: 9.63 K/UL — SIGNIFICANT CHANGE UP (ref 3.8–10.5)

## 2021-02-17 PROCEDURE — G0463: CPT

## 2021-02-17 PROCEDURE — 87086 URINE CULTURE/COLONY COUNT: CPT

## 2021-02-17 PROCEDURE — 80048 BASIC METABOLIC PNL TOTAL CA: CPT

## 2021-02-17 PROCEDURE — 83036 HEMOGLOBIN GLYCOSYLATED A1C: CPT

## 2021-02-17 PROCEDURE — 86850 RBC ANTIBODY SCREEN: CPT

## 2021-02-17 PROCEDURE — 86900 BLOOD TYPING SEROLOGIC ABO: CPT

## 2021-02-17 PROCEDURE — 85027 COMPLETE CBC AUTOMATED: CPT

## 2021-02-17 PROCEDURE — 86901 BLOOD TYPING SEROLOGIC RH(D): CPT

## 2021-02-17 RX ORDER — SODIUM CHLORIDE 9 MG/ML
3 INJECTION INTRAMUSCULAR; INTRAVENOUS; SUBCUTANEOUS EVERY 8 HOURS
Refills: 0 | Status: DISCONTINUED | OUTPATIENT
Start: 2021-03-02 | End: 2021-03-02

## 2021-02-17 RX ORDER — CEFAZOLIN SODIUM 1 G
3000 VIAL (EA) INJECTION ONCE
Refills: 0 | Status: DISCONTINUED | OUTPATIENT
Start: 2021-03-02 | End: 2021-03-02

## 2021-02-17 RX ORDER — METFORMIN HYDROCHLORIDE 850 MG/1
1 TABLET ORAL
Qty: 0 | Refills: 0 | DISCHARGE

## 2021-02-17 RX ORDER — LIDOCAINE HCL 20 MG/ML
0.2 VIAL (ML) INJECTION ONCE
Refills: 0 | Status: DISCONTINUED | OUTPATIENT
Start: 2021-03-02 | End: 2021-03-02

## 2021-02-17 NOTE — H&P PST ADULT - NSICDXPASTMEDICALHX_GEN_ALL_CORE_FT
PAST MEDICAL HISTORY:  DM (diabetes mellitus) Type 2 DM    HTN (hypertension)     Hyperlipidemia     Nephrolithiasis has passed on own in past     PAST MEDICAL HISTORY:  At risk for sleep apnea     DM (diabetes mellitus) Type 2 DM    HTN (hypertension)     Hyperlipidemia     Nephrolithiasis has passed stones on own in past    Severe obesity (BMI >= 40)      PAST MEDICAL HISTORY:  At risk for sleep apnea     COVID-19 (1)tested positive March/April 2020 illness controlled at home, never hospitalized; (2)December 2020 tested positive again after returning from [place] (positive PCR and Rapid test) - was asymptomatic    DM (diabetes mellitus) Type 2 DM    HTN (hypertension)     Hyperlipidemia     Nephrolithiasis has passed stones on own in past    Severe obesity (BMI >= 40)     Swelling of right foot 1 week ago - right dorsal foot with difficulty bearing weight on right foot - denies recent injury - swelling and pain resolved on own after 1 day, no current symptoms

## 2021-02-17 NOTE — H&P PST ADULT - MUSCULOSKELETAL COMMENTS
right foot pain and swelling 1 week ago - resolved on own right dorsal foot pain and swelling 1 week ago - resolved on own

## 2021-02-17 NOTE — H&P PST ADULT - HISTORY OF PRESENT ILLNESS
36 yo male with h/o morbid obesity (BMI= 41.2), HTN, HLD, and Type 2 DM presented to ED for evaluation of flank pain. Pt states he did not urinate an entire day. Imaging revealed bilateral obstructing ureteral calculi. Pt is s/p placement of bilateral ureteral stents. He is scheduled for Laser Ureteroscopy, Laser Lithotripsy, Stent Insertion/Change, Possible Left PCNL and Antegrade Ureteroscopy on 3/2/21.    Pt is scheduled for Covid-19 PCR on 2/27/21 at the [facility]. 34 yo male with h/o morbid obesity (BMI= 41.2), HTN, HLD, and Type 2 DM presented to ED for evaluation of flank pain. Pt states he did not urinate an entire day. Imaging revealed bilateral obstructing ureteral calculi. Pt is s/p placement of bilateral ureteral stents. He is scheduled for Laser Ureteroscopy, Laser Lithotripsy, Stent Insertion/Change, Possible Left PCNL and Antegrade Ureteroscopy on 3/2/21.    Pt denies symptoms of Covid-19 and any known recent exposure.    Pt is scheduled for Covid-19 PCR on 2/27/21 at the [facility].

## 2021-02-17 NOTE — H&P PST ADULT - NSICDXPROBLEM_GEN_ALL_CORE_FT
PROBLEM DIAGNOSES  Problem: Bilateral ureteral calculi  Assessment and Plan: Left Ureteroscopy  Laser Lithotripsy  Stent Insertion/Change  Possible Left PCNL  Antegrade Ureteroscopy    Problem: Type 2 diabetes mellitus  Assessment and Plan: Pt was instructed by his Nephrologist to hold metformin to OR  Pt instructed to check FS on am of surgery  Stat FS on admission    Problem: At risk for sleep apnea  Assessment and Plan: TYRON Precautions  OR Booking notified    Problem: Obesity, morbid, BMI 40.0-49.9  Assessment and Plan: OR Booking notified

## 2021-02-17 NOTE — H&P PST ADULT - NSANTHOSAYNRD_GEN_A_CORE
No. TYRON screening performed.  STOP BANG Legend: 0-2 = LOW Risk; 3-4 = INTERMEDIATE Risk; 5-8 = HIGH Risk

## 2021-02-17 NOTE — H&P PST ADULT - NSICDXPASTSURGICALHX_GEN_ALL_CORE_FT
PAST SURGICAL HISTORY:  S/P ureteral stent placement 1/17/21 - bilateral    Severe obesity (BMI >= 40)      PAST SURGICAL HISTORY:  S/P ureteral stent placement 1/17/21 - bilateral

## 2021-02-18 LAB
CULTURE RESULTS: NO GROWTH — SIGNIFICANT CHANGE UP
SPECIMEN SOURCE: SIGNIFICANT CHANGE UP

## 2021-02-24 PROBLEM — U07.1 COVID-19: Chronic | Status: ACTIVE | Noted: 2021-02-17

## 2021-02-24 PROBLEM — N20.0 CALCULUS OF KIDNEY: Chronic | Status: ACTIVE | Noted: 2021-01-16

## 2021-02-24 PROBLEM — Z91.89 OTHER SPECIFIED PERSONAL RISK FACTORS, NOT ELSEWHERE CLASSIFIED: Chronic | Status: ACTIVE | Noted: 2021-02-17

## 2021-02-24 PROBLEM — M79.89 OTHER SPECIFIED SOFT TISSUE DISORDERS: Chronic | Status: ACTIVE | Noted: 2021-02-17

## 2021-02-24 PROBLEM — E66.01 MORBID (SEVERE) OBESITY DUE TO EXCESS CALORIES: Chronic | Status: ACTIVE | Noted: 2021-02-17

## 2021-02-24 PROBLEM — E11.9 TYPE 2 DIABETES MELLITUS WITHOUT COMPLICATIONS: Chronic | Status: ACTIVE | Noted: 2021-01-16

## 2021-02-24 PROBLEM — E78.5 HYPERLIPIDEMIA, UNSPECIFIED: Chronic | Status: ACTIVE | Noted: 2021-02-17

## 2021-02-27 ENCOUNTER — OUTPATIENT (OUTPATIENT)
Dept: OUTPATIENT SERVICES | Facility: HOSPITAL | Age: 36
LOS: 1 days | End: 2021-02-27
Payer: MEDICAID

## 2021-02-27 DIAGNOSIS — Z11.52 ENCOUNTER FOR SCREENING FOR COVID-19: ICD-10-CM

## 2021-02-27 DIAGNOSIS — Z96.0 PRESENCE OF UROGENITAL IMPLANTS: Chronic | ICD-10-CM

## 2021-02-27 LAB — SARS-COV-2 RNA SPEC QL NAA+PROBE: SIGNIFICANT CHANGE UP

## 2021-03-02 ENCOUNTER — TRANSCRIPTION ENCOUNTER (OUTPATIENT)
Age: 36
End: 2021-03-02

## 2021-03-02 ENCOUNTER — RESULT REVIEW (OUTPATIENT)
Age: 36
End: 2021-03-02

## 2021-03-02 ENCOUNTER — INPATIENT (INPATIENT)
Facility: HOSPITAL | Age: 36
LOS: 2 days | Discharge: HOME | DRG: 660 | End: 2021-03-05
Attending: UROLOGY | Admitting: UROLOGY
Payer: MEDICAID

## 2021-03-02 ENCOUNTER — APPOINTMENT (OUTPATIENT)
Dept: UROLOGY | Facility: HOSPITAL | Age: 36
End: 2021-03-02

## 2021-03-02 VITALS
RESPIRATION RATE: 16 BRPM | HEART RATE: 94 BPM | WEIGHT: 270.95 LBS | OXYGEN SATURATION: 99 % | TEMPERATURE: 98 F | SYSTOLIC BLOOD PRESSURE: 138 MMHG | DIASTOLIC BLOOD PRESSURE: 92 MMHG | HEIGHT: 68 IN

## 2021-03-02 DIAGNOSIS — Z96.0 PRESENCE OF UROGENITAL IMPLANTS: Chronic | ICD-10-CM

## 2021-03-02 DIAGNOSIS — N20.0 CALCULUS OF KIDNEY: ICD-10-CM

## 2021-03-02 LAB
ANION GAP SERPL CALC-SCNC: 14 MMOL/L — SIGNIFICANT CHANGE UP (ref 5–17)
BUN SERPL-MCNC: 20 MG/DL — SIGNIFICANT CHANGE UP (ref 7–23)
CALCIUM SERPL-MCNC: 9.4 MG/DL — SIGNIFICANT CHANGE UP (ref 8.4–10.5)
CHLORIDE SERPL-SCNC: 106 MMOL/L — SIGNIFICANT CHANGE UP (ref 96–108)
CO2 SERPL-SCNC: 20 MMOL/L — LOW (ref 22–31)
CREAT SERPL-MCNC: 1.53 MG/DL — HIGH (ref 0.5–1.3)
GLUCOSE BLDC GLUCOMTR-MCNC: 132 MG/DL — HIGH (ref 70–99)
GLUCOSE BLDC GLUCOMTR-MCNC: 216 MG/DL — HIGH (ref 70–99)
GLUCOSE SERPL-MCNC: 164 MG/DL — HIGH (ref 70–99)
HCT VFR BLD CALC: 34.9 % — LOW (ref 39–50)
HGB BLD-MCNC: 11.3 G/DL — LOW (ref 13–17)
MCHC RBC-ENTMCNC: 26.7 PG — LOW (ref 27–34)
MCHC RBC-ENTMCNC: 32.4 GM/DL — SIGNIFICANT CHANGE UP (ref 32–36)
MCV RBC AUTO: 82.5 FL — SIGNIFICANT CHANGE UP (ref 80–100)
NRBC # BLD: 0 /100 WBCS — SIGNIFICANT CHANGE UP (ref 0–0)
PLATELET # BLD AUTO: 273 K/UL — SIGNIFICANT CHANGE UP (ref 150–400)
POTASSIUM SERPL-MCNC: 4.3 MMOL/L — SIGNIFICANT CHANGE UP (ref 3.5–5.3)
POTASSIUM SERPL-SCNC: 4.3 MMOL/L — SIGNIFICANT CHANGE UP (ref 3.5–5.3)
RBC # BLD: 4.23 M/UL — SIGNIFICANT CHANGE UP (ref 4.2–5.8)
RBC # FLD: 14 % — SIGNIFICANT CHANGE UP (ref 10.3–14.5)
RH IG SCN BLD-IMP: POSITIVE — SIGNIFICANT CHANGE UP
SODIUM SERPL-SCNC: 140 MMOL/L — SIGNIFICANT CHANGE UP (ref 135–145)
WBC # BLD: 12.72 K/UL — HIGH (ref 3.8–10.5)
WBC # FLD AUTO: 12.72 K/UL — HIGH (ref 3.8–10.5)

## 2021-03-02 PROCEDURE — 76000 FLUOROSCOPY <1 HR PHYS/QHP: CPT

## 2021-03-02 PROCEDURE — 88300 SURGICAL PATH GROSS: CPT | Mod: 26

## 2021-03-02 PROCEDURE — U0005: CPT

## 2021-03-02 PROCEDURE — U0003: CPT

## 2021-03-02 PROCEDURE — C9803: CPT

## 2021-03-02 RX ORDER — ACETAMINOPHEN 500 MG
975 TABLET ORAL EVERY 6 HOURS
Refills: 0 | Status: DISCONTINUED | OUTPATIENT
Start: 2021-03-02 | End: 2021-03-05

## 2021-03-02 RX ORDER — HYDROMORPHONE HYDROCHLORIDE 2 MG/ML
0.25 INJECTION INTRAMUSCULAR; INTRAVENOUS; SUBCUTANEOUS
Refills: 0 | Status: DISCONTINUED | OUTPATIENT
Start: 2021-03-02 | End: 2021-03-03

## 2021-03-02 RX ORDER — METFORMIN HYDROCHLORIDE 850 MG/1
1 TABLET ORAL
Qty: 0 | Refills: 0 | DISCHARGE

## 2021-03-02 RX ORDER — SODIUM CHLORIDE 9 MG/ML
1000 INJECTION, SOLUTION INTRAVENOUS
Refills: 0 | Status: DISCONTINUED | OUTPATIENT
Start: 2021-03-02 | End: 2021-03-05

## 2021-03-02 RX ORDER — DEXTROSE 50 % IN WATER 50 %
12.5 SYRINGE (ML) INTRAVENOUS ONCE
Refills: 0 | Status: DISCONTINUED | OUTPATIENT
Start: 2021-03-02 | End: 2021-03-05

## 2021-03-02 RX ORDER — ATORVASTATIN CALCIUM 80 MG/1
1 TABLET, FILM COATED ORAL
Qty: 0 | Refills: 0 | DISCHARGE

## 2021-03-02 RX ORDER — INSULIN LISPRO 100/ML
VIAL (ML) SUBCUTANEOUS
Refills: 0 | Status: DISCONTINUED | OUTPATIENT
Start: 2021-03-02 | End: 2021-03-04

## 2021-03-02 RX ORDER — ATORVASTATIN CALCIUM 80 MG/1
40 TABLET, FILM COATED ORAL AT BEDTIME
Refills: 0 | Status: DISCONTINUED | OUTPATIENT
Start: 2021-03-02 | End: 2021-03-05

## 2021-03-02 RX ORDER — GLUCAGON INJECTION, SOLUTION 0.5 MG/.1ML
1 INJECTION, SOLUTION SUBCUTANEOUS ONCE
Refills: 0 | Status: DISCONTINUED | OUTPATIENT
Start: 2021-03-02 | End: 2021-03-05

## 2021-03-02 RX ORDER — DEXTROSE 50 % IN WATER 50 %
25 SYRINGE (ML) INTRAVENOUS ONCE
Refills: 0 | Status: DISCONTINUED | OUTPATIENT
Start: 2021-03-02 | End: 2021-03-05

## 2021-03-02 RX ORDER — CEFAZOLIN SODIUM 1 G
3000 VIAL (EA) INJECTION EVERY 8 HOURS
Refills: 0 | Status: DISCONTINUED | OUTPATIENT
Start: 2021-03-02 | End: 2021-03-05

## 2021-03-02 RX ORDER — TRAMADOL HYDROCHLORIDE 50 MG/1
25 TABLET ORAL EVERY 6 HOURS
Refills: 0 | Status: DISCONTINUED | OUTPATIENT
Start: 2021-03-02 | End: 2021-03-05

## 2021-03-02 RX ORDER — ONDANSETRON 8 MG/1
8 TABLET, FILM COATED ORAL ONCE
Refills: 0 | Status: DISCONTINUED | OUTPATIENT
Start: 2021-03-02 | End: 2021-03-03

## 2021-03-02 RX ORDER — DEXTROSE 50 % IN WATER 50 %
15 SYRINGE (ML) INTRAVENOUS ONCE
Refills: 0 | Status: DISCONTINUED | OUTPATIENT
Start: 2021-03-02 | End: 2021-03-05

## 2021-03-02 RX ORDER — AMLODIPINE BESYLATE 2.5 MG/1
10 TABLET ORAL DAILY
Refills: 0 | Status: DISCONTINUED | OUTPATIENT
Start: 2021-03-02 | End: 2021-03-05

## 2021-03-02 RX ORDER — HEPARIN SODIUM 5000 [USP'U]/ML
5000 INJECTION INTRAVENOUS; SUBCUTANEOUS EVERY 8 HOURS
Refills: 0 | Status: DISCONTINUED | OUTPATIENT
Start: 2021-03-02 | End: 2021-03-05

## 2021-03-02 RX ORDER — ACETAMINOPHEN 500 MG
1000 TABLET ORAL ONCE
Refills: 0 | Status: COMPLETED | OUTPATIENT
Start: 2021-03-02 | End: 2021-03-02

## 2021-03-02 RX ORDER — KETOROLAC TROMETHAMINE 30 MG/ML
15 SYRINGE (ML) INJECTION EVERY 6 HOURS
Refills: 0 | Status: DISCONTINUED | OUTPATIENT
Start: 2021-03-02 | End: 2021-03-04

## 2021-03-02 RX ORDER — SODIUM CHLORIDE 9 MG/ML
1000 INJECTION, SOLUTION INTRAVENOUS
Refills: 0 | Status: DISCONTINUED | OUTPATIENT
Start: 2021-03-02 | End: 2021-03-03

## 2021-03-02 RX ORDER — CHLORHEXIDINE GLUCONATE 213 G/1000ML
1 SOLUTION TOPICAL ONCE
Refills: 0 | Status: DISCONTINUED | OUTPATIENT
Start: 2021-03-02 | End: 2021-03-03

## 2021-03-02 RX ORDER — TRAMADOL HYDROCHLORIDE 50 MG/1
50 TABLET ORAL EVERY 6 HOURS
Refills: 0 | Status: DISCONTINUED | OUTPATIENT
Start: 2021-03-02 | End: 2021-03-05

## 2021-03-02 RX ADMIN — Medication 15 MILLIGRAM(S): at 17:22

## 2021-03-02 RX ADMIN — Medication 1: at 17:20

## 2021-03-02 RX ADMIN — Medication 400 MILLIGRAM(S): at 23:50

## 2021-03-02 RX ADMIN — Medication 2: at 22:01

## 2021-03-02 RX ADMIN — ATORVASTATIN CALCIUM 40 MILLIGRAM(S): 80 TABLET, FILM COATED ORAL at 22:57

## 2021-03-02 RX ADMIN — SODIUM CHLORIDE 125 MILLILITER(S): 9 INJECTION, SOLUTION INTRAVENOUS at 23:04

## 2021-03-02 RX ADMIN — HEPARIN SODIUM 5000 UNIT(S): 5000 INJECTION INTRAVENOUS; SUBCUTANEOUS at 22:59

## 2021-03-02 RX ADMIN — Medication 200 MILLIGRAM(S): at 22:57

## 2021-03-02 NOTE — CHART NOTE - NSCHARTNOTEFT_GEN_A_CORE
Post op Check    Pt seen and examined without complaints. Pain is controlled. Denies SOB/CP/N/V.     Vital Signs Last 24 Hrs  T(C): 37.4 (02 Mar 2021 15:40), Max: 37.4 (02 Mar 2021 15:40)  T(F): 99.3 (02 Mar 2021 15:40), Max: 99.3 (02 Mar 2021 15:40)  HR: 98 (02 Mar 2021 17:15) (94 - 104)  BP: 132/80 (02 Mar 2021 17:15) (125/77 - 138/92)  BP(mean): 100 (02 Mar 2021 17:15) (95 - 106)  RR: 18 (02 Mar 2021 17:15) (16 - 18)  SpO2: 98% (02 Mar 2021 17:15) (94% - 99%)    I&O's Summary    02 Mar 2021 07:01  -  02 Mar 2021 17:52  --------------------------------------------------------  IN: 175 mL / OUT: 220 mL / NET: -45 mL        Physical Exam  Gen: NAD, A&Ox3  Pulm: No respiratory distress, no subcostal retractions  CV: RRR, no JVD  Abd: Soft, NT, ND, nephrostomy site is mildly saturated, nephrostomy tube is cherry low flow  Back: AA  : Mercedes in place, cherry clear urine                          11.3   12.72 )-----------( 273      ( 02 Mar 2021 16:44 )             34.9       03-02    140  |  106  |  20  ----------------------------<  164<H>  4.3   |  20<L>  |  1.53<H>    Ca    9.4      02 Mar 2021 16:44        A/P: 35y Male s/p R ureteral stent exchange, left URS, Left PCNL   am labs  -ancef  -f/u Stone, Kidney cultures  DVT prophylaxis/OOB  Incentive spirometry  Strict I&O's  Analgesia and antiemetics as needed  23 hour stay, will evaluate Am CT

## 2021-03-03 ENCOUNTER — TRANSCRIPTION ENCOUNTER (OUTPATIENT)
Age: 36
End: 2021-03-03

## 2021-03-03 ENCOUNTER — RESULT REVIEW (OUTPATIENT)
Age: 36
End: 2021-03-03

## 2021-03-03 LAB
ANION GAP SERPL CALC-SCNC: 12 MMOL/L — SIGNIFICANT CHANGE UP (ref 5–17)
BUN SERPL-MCNC: 19 MG/DL — SIGNIFICANT CHANGE UP (ref 7–23)
CALCIUM SERPL-MCNC: 9.2 MG/DL — SIGNIFICANT CHANGE UP (ref 8.4–10.5)
CHLORIDE SERPL-SCNC: 101 MMOL/L — SIGNIFICANT CHANGE UP (ref 96–108)
CO2 SERPL-SCNC: 22 MMOL/L — SIGNIFICANT CHANGE UP (ref 22–31)
CREAT SERPL-MCNC: 1.5 MG/DL — HIGH (ref 0.5–1.3)
GLUCOSE BLDC GLUCOMTR-MCNC: 130 MG/DL — HIGH (ref 70–99)
GLUCOSE BLDC GLUCOMTR-MCNC: 136 MG/DL — HIGH (ref 70–99)
GLUCOSE BLDC GLUCOMTR-MCNC: 138 MG/DL — HIGH (ref 70–99)
GLUCOSE BLDC GLUCOMTR-MCNC: 145 MG/DL — HIGH (ref 70–99)
GLUCOSE SERPL-MCNC: 172 MG/DL — HIGH (ref 70–99)
HCT VFR BLD CALC: 30.5 % — LOW (ref 39–50)
HGB BLD-MCNC: 10 G/DL — LOW (ref 13–17)
MCHC RBC-ENTMCNC: 26.7 PG — LOW (ref 27–34)
MCHC RBC-ENTMCNC: 32.8 GM/DL — SIGNIFICANT CHANGE UP (ref 32–36)
MCV RBC AUTO: 81.6 FL — SIGNIFICANT CHANGE UP (ref 80–100)
NRBC # BLD: 0 /100 WBCS — SIGNIFICANT CHANGE UP (ref 0–0)
PLATELET # BLD AUTO: 290 K/UL — SIGNIFICANT CHANGE UP (ref 150–400)
POTASSIUM SERPL-MCNC: 4.1 MMOL/L — SIGNIFICANT CHANGE UP (ref 3.5–5.3)
POTASSIUM SERPL-SCNC: 4.1 MMOL/L — SIGNIFICANT CHANGE UP (ref 3.5–5.3)
RBC # BLD: 3.74 M/UL — LOW (ref 4.2–5.8)
RBC # FLD: 13.8 % — SIGNIFICANT CHANGE UP (ref 10.3–14.5)
SARS-COV-2 RNA SPEC QL NAA+PROBE: SIGNIFICANT CHANGE UP
SODIUM SERPL-SCNC: 135 MMOL/L — SIGNIFICANT CHANGE UP (ref 135–145)
WBC # BLD: 12.09 K/UL — HIGH (ref 3.8–10.5)
WBC # FLD AUTO: 12.09 K/UL — HIGH (ref 3.8–10.5)

## 2021-03-03 PROCEDURE — 74176 CT ABD & PELVIS W/O CONTRAST: CPT | Mod: 26,MH

## 2021-03-03 RX ORDER — POLYETHYLENE GLYCOL 3350 17 G/17G
17 POWDER, FOR SOLUTION ORAL DAILY
Refills: 0 | Status: DISCONTINUED | OUTPATIENT
Start: 2021-03-03 | End: 2021-03-05

## 2021-03-03 RX ORDER — SODIUM CHLORIDE 9 MG/ML
1000 INJECTION, SOLUTION INTRAVENOUS
Refills: 0 | Status: DISCONTINUED | OUTPATIENT
Start: 2021-03-03 | End: 2021-03-05

## 2021-03-03 RX ADMIN — Medication 15 MILLIGRAM(S): at 20:15

## 2021-03-03 RX ADMIN — HEPARIN SODIUM 5000 UNIT(S): 5000 INJECTION INTRAVENOUS; SUBCUTANEOUS at 05:49

## 2021-03-03 RX ADMIN — Medication 200 MILLIGRAM(S): at 05:50

## 2021-03-03 RX ADMIN — Medication 15 MILLIGRAM(S): at 20:02

## 2021-03-03 RX ADMIN — Medication 200 MILLIGRAM(S): at 13:21

## 2021-03-03 RX ADMIN — SODIUM CHLORIDE 75 MILLILITER(S): 9 INJECTION, SOLUTION INTRAVENOUS at 20:03

## 2021-03-03 RX ADMIN — HEPARIN SODIUM 5000 UNIT(S): 5000 INJECTION INTRAVENOUS; SUBCUTANEOUS at 14:10

## 2021-03-03 RX ADMIN — SODIUM CHLORIDE 75 MILLILITER(S): 9 INJECTION, SOLUTION INTRAVENOUS at 19:03

## 2021-03-03 RX ADMIN — Medication 975 MILLIGRAM(S): at 08:17

## 2021-03-03 RX ADMIN — Medication 15 MILLIGRAM(S): at 12:02

## 2021-03-03 RX ADMIN — Medication 15 MILLIGRAM(S): at 05:50

## 2021-03-03 RX ADMIN — AMLODIPINE BESYLATE 10 MILLIGRAM(S): 2.5 TABLET ORAL at 12:01

## 2021-03-03 RX ADMIN — Medication 15 MILLIGRAM(S): at 00:30

## 2021-03-03 RX ADMIN — Medication 975 MILLIGRAM(S): at 08:50

## 2021-03-03 RX ADMIN — POLYETHYLENE GLYCOL 3350 17 GRAM(S): 17 POWDER, FOR SOLUTION ORAL at 19:02

## 2021-03-03 NOTE — PROGRESS NOTE ADULT - ASSESSMENT
A/P: 35y Male s/p R ureteral stent exchange, left URS, Left PCNL POD 1  -ancef  -f/u Stone, Kidney cultures  DVT prophylaxis/OOB  Incentive spirometry  Strict I&O's  Analgesia and antiemetics as needed  CT this am  TOV/PVR    A/P: 35y Male s/p R ureteral stent exchange, left URS, Left PCNL POD 1  -ancef  -f/u Stone, Kidney cultures  DVT prophylaxis/OOB  Incentive spirometry  Strict I&O's  Analgesia and antiemetics as needed  CT this am for residual stones check  TOV/PVR

## 2021-03-03 NOTE — CHART NOTE - NSCHARTNOTEFT_GEN_A_CORE
Pt seen and examined at the bedside.  Pt states he is feeling well. Was having difficulty urinating earlier today, but has been able to void sufficient amounts since. Feels as though he is completely emptying. Having mild pain over L flank, near NT insertion when he voids, that subsides on its own after short period of time. Has been voiding lying down, would like to try urinating standing up to see if he is able to empty more completely next time he has sensation to urinate. Denies acute complaint at this time.    Vital Signs Last 24 Hrs  T(C): 36.7 (03 Mar 2021 19:36), Max: 37.7 (03 Mar 2021 08:02)  T(F): 98.1 (03 Mar 2021 19:36), Max: 99.9 (03 Mar 2021 08:02)  HR: 95 (03 Mar 2021 19:36) (86 - 105)  BP: 125/86 (03 Mar 2021 19:36) (103/63 - 135/83)  BP(mean): 97 (03 Mar 2021 17:00) (76 - 102)  RR: 16 (03 Mar 2021 19:36) (16 - 18)  SpO2: 98% (03 Mar 2021 19:36) (96% - 100%)    General: NAD, Lying in bed comfortably  Resp: no respiratory distress or accessory muscle use.  GI/Abd: Soft, NT/ND, no rebound/guarding  Back: L NT in place with torres colored urine.  : voiding    A/P: 36yo M s/p L PCNL and L URS, scheduled for R URS on 3/4  - NPOpMN/IVF in anticipation of procedure tomorrow  - PVR after next void to ensure adequate drainage  - continue to follow plan as listed in previous progress note.

## 2021-03-04 ENCOUNTER — APPOINTMENT (OUTPATIENT)
Dept: UROLOGY | Facility: HOSPITAL | Age: 36
End: 2021-03-04

## 2021-03-04 ENCOUNTER — RESULT REVIEW (OUTPATIENT)
Age: 36
End: 2021-03-04

## 2021-03-04 LAB
ANION GAP SERPL CALC-SCNC: 11 MMOL/L — SIGNIFICANT CHANGE UP (ref 5–17)
ANION GAP SERPL CALC-SCNC: 15 MMOL/L — SIGNIFICANT CHANGE UP (ref 5–17)
BUN SERPL-MCNC: 14 MG/DL — SIGNIFICANT CHANGE UP (ref 7–23)
BUN SERPL-MCNC: 18 MG/DL — SIGNIFICANT CHANGE UP (ref 7–23)
CALCIUM SERPL-MCNC: 10.2 MG/DL — SIGNIFICANT CHANGE UP (ref 8.4–10.5)
CALCIUM SERPL-MCNC: 9.5 MG/DL — SIGNIFICANT CHANGE UP (ref 8.4–10.5)
CHLORIDE SERPL-SCNC: 101 MMOL/L — SIGNIFICANT CHANGE UP (ref 96–108)
CHLORIDE SERPL-SCNC: 104 MMOL/L — SIGNIFICANT CHANGE UP (ref 96–108)
CO2 SERPL-SCNC: 23 MMOL/L — SIGNIFICANT CHANGE UP (ref 22–31)
CO2 SERPL-SCNC: 25 MMOL/L — SIGNIFICANT CHANGE UP (ref 22–31)
CREAT SERPL-MCNC: 1.24 MG/DL — SIGNIFICANT CHANGE UP (ref 0.5–1.3)
CREAT SERPL-MCNC: 1.31 MG/DL — HIGH (ref 0.5–1.3)
CULTURE RESULTS: NO GROWTH — SIGNIFICANT CHANGE UP
CULTURE RESULTS: SIGNIFICANT CHANGE UP
GLUCOSE BLDC GLUCOMTR-MCNC: 102 MG/DL — HIGH (ref 70–99)
GLUCOSE BLDC GLUCOMTR-MCNC: 108 MG/DL — HIGH (ref 70–99)
GLUCOSE BLDC GLUCOMTR-MCNC: 111 MG/DL — HIGH (ref 70–99)
GLUCOSE BLDC GLUCOMTR-MCNC: 135 MG/DL — HIGH (ref 70–99)
GLUCOSE BLDC GLUCOMTR-MCNC: 285 MG/DL — HIGH (ref 70–99)
GLUCOSE SERPL-MCNC: 132 MG/DL — HIGH (ref 70–99)
GLUCOSE SERPL-MCNC: 148 MG/DL — HIGH (ref 70–99)
HCT VFR BLD CALC: 30.9 % — LOW (ref 39–50)
HCT VFR BLD CALC: 37.3 % — LOW (ref 39–50)
HGB BLD-MCNC: 11.8 G/DL — LOW (ref 13–17)
HGB BLD-MCNC: 9.9 G/DL — LOW (ref 13–17)
MCHC RBC-ENTMCNC: 26.2 PG — LOW (ref 27–34)
MCHC RBC-ENTMCNC: 26.9 PG — LOW (ref 27–34)
MCHC RBC-ENTMCNC: 31.6 GM/DL — LOW (ref 32–36)
MCHC RBC-ENTMCNC: 32 GM/DL — SIGNIFICANT CHANGE UP (ref 32–36)
MCV RBC AUTO: 82.7 FL — SIGNIFICANT CHANGE UP (ref 80–100)
MCV RBC AUTO: 84 FL — SIGNIFICANT CHANGE UP (ref 80–100)
NRBC # BLD: 0 /100 WBCS — SIGNIFICANT CHANGE UP (ref 0–0)
NRBC # BLD: 0 /100 WBCS — SIGNIFICANT CHANGE UP (ref 0–0)
PLATELET # BLD AUTO: 249 K/UL — SIGNIFICANT CHANGE UP (ref 150–400)
PLATELET # BLD AUTO: 282 K/UL — SIGNIFICANT CHANGE UP (ref 150–400)
POTASSIUM SERPL-MCNC: 3.6 MMOL/L — SIGNIFICANT CHANGE UP (ref 3.5–5.3)
POTASSIUM SERPL-MCNC: 4.2 MMOL/L — SIGNIFICANT CHANGE UP (ref 3.5–5.3)
POTASSIUM SERPL-SCNC: 3.6 MMOL/L — SIGNIFICANT CHANGE UP (ref 3.5–5.3)
POTASSIUM SERPL-SCNC: 4.2 MMOL/L — SIGNIFICANT CHANGE UP (ref 3.5–5.3)
RBC # BLD: 3.68 M/UL — LOW (ref 4.2–5.8)
RBC # BLD: 4.51 M/UL — SIGNIFICANT CHANGE UP (ref 4.2–5.8)
RBC # FLD: 13.8 % — SIGNIFICANT CHANGE UP (ref 10.3–14.5)
RBC # FLD: 14.1 % — SIGNIFICANT CHANGE UP (ref 10.3–14.5)
SODIUM SERPL-SCNC: 138 MMOL/L — SIGNIFICANT CHANGE UP (ref 135–145)
SODIUM SERPL-SCNC: 141 MMOL/L — SIGNIFICANT CHANGE UP (ref 135–145)
SPECIMEN SOURCE: SIGNIFICANT CHANGE UP
SPECIMEN SOURCE: SIGNIFICANT CHANGE UP
WBC # BLD: 12.84 K/UL — HIGH (ref 3.8–10.5)
WBC # BLD: 9 K/UL — SIGNIFICANT CHANGE UP (ref 3.8–10.5)
WBC # FLD AUTO: 12.84 K/UL — HIGH (ref 3.8–10.5)
WBC # FLD AUTO: 9 K/UL — SIGNIFICANT CHANGE UP (ref 3.8–10.5)

## 2021-03-04 PROCEDURE — 93010 ELECTROCARDIOGRAM REPORT: CPT

## 2021-03-04 PROCEDURE — 74420 UROGRAPHY RTRGR +-KUB: CPT | Mod: 26

## 2021-03-04 PROCEDURE — 50389 REMOVE RENAL TUBE W/FLUORO: CPT | Mod: LT,59,58

## 2021-03-04 PROCEDURE — 88300 SURGICAL PATH GROSS: CPT | Mod: 26

## 2021-03-04 PROCEDURE — 52356 CYSTO/URETERO W/LITHOTRIPSY: CPT | Mod: RT,79

## 2021-03-04 RX ORDER — CHLORHEXIDINE GLUCONATE 213 G/1000ML
1 SOLUTION TOPICAL ONCE
Refills: 0 | Status: DISCONTINUED | OUTPATIENT
Start: 2021-03-04 | End: 2021-03-05

## 2021-03-04 RX ORDER — HYDROMORPHONE HYDROCHLORIDE 2 MG/ML
0.5 INJECTION INTRAMUSCULAR; INTRAVENOUS; SUBCUTANEOUS
Refills: 0 | Status: DISCONTINUED | OUTPATIENT
Start: 2021-03-04 | End: 2021-03-04

## 2021-03-04 RX ORDER — SODIUM CHLORIDE 9 MG/ML
1000 INJECTION, SOLUTION INTRAVENOUS ONCE
Refills: 0 | Status: COMPLETED | OUTPATIENT
Start: 2021-03-04 | End: 2021-03-04

## 2021-03-04 RX ORDER — ONDANSETRON 8 MG/1
4 TABLET, FILM COATED ORAL ONCE
Refills: 0 | Status: DISCONTINUED | OUTPATIENT
Start: 2021-03-04 | End: 2021-03-04

## 2021-03-04 RX ORDER — INSULIN LISPRO 100/ML
VIAL (ML) SUBCUTANEOUS EVERY 6 HOURS
Refills: 0 | Status: DISCONTINUED | OUTPATIENT
Start: 2021-03-04 | End: 2021-03-05

## 2021-03-04 RX ADMIN — Medication 3: at 23:52

## 2021-03-04 RX ADMIN — Medication 200 MILLIGRAM(S): at 13:07

## 2021-03-04 RX ADMIN — Medication 15 MILLIGRAM(S): at 02:11

## 2021-03-04 RX ADMIN — Medication 200 MILLIGRAM(S): at 00:02

## 2021-03-04 RX ADMIN — Medication 15 MILLIGRAM(S): at 02:30

## 2021-03-04 RX ADMIN — HEPARIN SODIUM 5000 UNIT(S): 5000 INJECTION INTRAVENOUS; SUBCUTANEOUS at 06:21

## 2021-03-04 RX ADMIN — HEPARIN SODIUM 5000 UNIT(S): 5000 INJECTION INTRAVENOUS; SUBCUTANEOUS at 21:59

## 2021-03-04 RX ADMIN — SODIUM CHLORIDE 1000 MILLILITER(S): 9 INJECTION, SOLUTION INTRAVENOUS at 22:32

## 2021-03-04 RX ADMIN — AMLODIPINE BESYLATE 10 MILLIGRAM(S): 2.5 TABLET ORAL at 06:17

## 2021-03-04 RX ADMIN — HEPARIN SODIUM 5000 UNIT(S): 5000 INJECTION INTRAVENOUS; SUBCUTANEOUS at 13:07

## 2021-03-04 RX ADMIN — Medication 200 MILLIGRAM(S): at 06:17

## 2021-03-04 RX ADMIN — HEPARIN SODIUM 5000 UNIT(S): 5000 INJECTION INTRAVENOUS; SUBCUTANEOUS at 00:03

## 2021-03-04 RX ADMIN — ATORVASTATIN CALCIUM 40 MILLIGRAM(S): 80 TABLET, FILM COATED ORAL at 00:03

## 2021-03-04 RX ADMIN — Medication 200 MILLIGRAM(S): at 22:30

## 2021-03-04 RX ADMIN — ATORVASTATIN CALCIUM 40 MILLIGRAM(S): 80 TABLET, FILM COATED ORAL at 21:59

## 2021-03-04 NOTE — PROGRESS NOTE ADULT - ASSESSMENT
A/P: 35y Male s/p right URS, laser litho, stent exchange and removal of left nephro-U  DVT prophylaxis/OOB  Incentive spirometry  Strict I&O's  Analgesia and antiemetics as needed  Diet-regular   AM labs and repeat labs in pacu   Repeat PVR   EKG-Sinus tach 106

## 2021-03-04 NOTE — PROGRESS NOTE ADULT - ASSESSMENT
A/P: 35y Male s/p R ureteral stent exchange, left URS, Left PCNL POD 2  - ancef  - am labs  - OR for right URS  - poss d/c after OR   A/P: 35y Male s/p R ureteral stent exchange, left URS, Left PCNL POD 2  - ancef  - am labs  - OR for right URS stent change, removal of left nephroureteral tube  - r/b/a discussed once again  - poss d/c after OR

## 2021-03-04 NOTE — BRIEF OPERATIVE NOTE - NSICDXBRIEFPREOP_GEN_ALL_CORE_FT
PRE-OP DIAGNOSIS:  Bilateral nephrolithiasis 02-Mar-2021 16:14:05  Roberto Walsh  
PRE-OP DIAGNOSIS:  Bilateral nephrolithiasis 02-Mar-2021 16:14:05  Roberto Walsh

## 2021-03-04 NOTE — PRE-ANESTHESIA EVALUATION ADULT - NSPROPOSEDPROCEDFT_GEN_ALL_CORE
cystoscopy, bilateral ureteroscopy, removal of left ureteral stent, percutaneous left nephrolithomy
cysto right ureteroscopy laser lithotripsy stone removal stent exchange, left nephrostomy tube removal

## 2021-03-04 NOTE — BRIEF OPERATIVE NOTE - NSICDXBRIEFPOSTOP_GEN_ALL_CORE_FT
POST-OP DIAGNOSIS:  Bilateral nephrolithiasis 02-Mar-2021 16:14:15  Roberto Walsh  
POST-OP DIAGNOSIS:  Bilateral nephrolithiasis 02-Mar-2021 16:14:15  Roberto Walsh

## 2021-03-04 NOTE — BRIEF OPERATIVE NOTE - OPERATION/FINDINGS
right ureteroscopy  laser lithotripsy  right stent exchange - 826 with string  removal of left nephro-U
cysto, R stent change, L stent removal, L rigid URS, L PCNL  1:1:100  stone appeared to be uric acid

## 2021-03-04 NOTE — BRIEF OPERATIVE NOTE - NSICDXBRIEFPROCEDURE_GEN_ALL_CORE_FT
PROCEDURES:  Cystourethroscopy with ureteroscopy with manipulation of calculus 02-Mar-2021 16:13:54  Roberto Walsh  
PROCEDURES:  Cystourethroscopy with ureteroscopy with manipulation of calculus 02-Mar-2021 16:13:54  Roberto Walsh  Cystoscopic insertion of ureteral stent 02-Mar-2021 16:12:45  Roberto Walsh  Nephrostolithotomy, percutaneous, with basket extraction, large stone 02-Mar-2021 16:12:23  Roberto Walsh

## 2021-03-04 NOTE — PRE-ANESTHESIA EVALUATION ADULT - NSANTHOSAYNRD_GEN_A_CORE
No. TYRON screening performed.  STOP BANG Legend: 0-2 = LOW Risk; 3-4 = INTERMEDIATE Risk; 5-8 = HIGH Risk
No. TYRON screening performed.  STOP BANG Legend: 0-2 = LOW Risk; 3-4 = INTERMEDIATE Risk; 5-8 = HIGH Risk

## 2021-03-05 ENCOUNTER — TRANSCRIPTION ENCOUNTER (OUTPATIENT)
Age: 36
End: 2021-03-05

## 2021-03-05 VITALS
OXYGEN SATURATION: 97 % | RESPIRATION RATE: 20 BRPM | DIASTOLIC BLOOD PRESSURE: 84 MMHG | TEMPERATURE: 98 F | SYSTOLIC BLOOD PRESSURE: 128 MMHG | HEART RATE: 91 BPM

## 2021-03-05 LAB — GLUCOSE BLDC GLUCOMTR-MCNC: 125 MG/DL — HIGH (ref 70–99)

## 2021-03-05 PROCEDURE — 71045 X-RAY EXAM CHEST 1 VIEW: CPT

## 2021-03-05 PROCEDURE — 76000 FLUOROSCOPY <1 HR PHYS/QHP: CPT

## 2021-03-05 PROCEDURE — 85027 COMPLETE CBC AUTOMATED: CPT

## 2021-03-05 PROCEDURE — 88300 SURGICAL PATH GROSS: CPT

## 2021-03-05 PROCEDURE — C1769: CPT

## 2021-03-05 PROCEDURE — 93005 ELECTROCARDIOGRAM TRACING: CPT

## 2021-03-05 PROCEDURE — U0003: CPT

## 2021-03-05 PROCEDURE — 82365 CALCULUS SPECTROSCOPY: CPT

## 2021-03-05 PROCEDURE — C2625: CPT

## 2021-03-05 PROCEDURE — 87070 CULTURE OTHR SPECIMN AEROBIC: CPT

## 2021-03-05 PROCEDURE — U0005: CPT

## 2021-03-05 PROCEDURE — C2617: CPT

## 2021-03-05 PROCEDURE — 80048 BASIC METABOLIC PNL TOTAL CA: CPT

## 2021-03-05 PROCEDURE — 74176 CT ABD & PELVIS W/O CONTRAST: CPT

## 2021-03-05 PROCEDURE — C1726: CPT

## 2021-03-05 PROCEDURE — 87086 URINE CULTURE/COLONY COUNT: CPT

## 2021-03-05 PROCEDURE — C1758: CPT

## 2021-03-05 PROCEDURE — C9399: CPT

## 2021-03-05 PROCEDURE — 99238 HOSP IP/OBS DSCHRG MGMT 30/<: CPT

## 2021-03-05 PROCEDURE — 82962 GLUCOSE BLOOD TEST: CPT

## 2021-03-05 PROCEDURE — C1889: CPT

## 2021-03-05 RX ORDER — BENZOCAINE AND MENTHOL 5; 1 G/100ML; G/100ML
1 LIQUID ORAL
Refills: 0 | Status: DISCONTINUED | OUTPATIENT
Start: 2021-03-05 | End: 2021-03-05

## 2021-03-05 RX ORDER — POTASSIUM CITRATE MONOHYDRATE 100 %
2 POWDER (GRAM) MISCELLANEOUS
Qty: 120 | Refills: 0
Start: 2021-03-05 | End: 2021-04-03

## 2021-03-05 RX ORDER — TRAMADOL HYDROCHLORIDE 50 MG/1
1 TABLET ORAL
Qty: 20 | Refills: 0
Start: 2021-03-05 | End: 2021-03-09

## 2021-03-05 RX ORDER — SODIUM BICARBONATE 1 MEQ/ML
2 SYRINGE (ML) INTRAVENOUS
Qty: 120 | Refills: 0
Start: 2021-03-05 | End: 2021-04-03

## 2021-03-05 RX ORDER — ACETAMINOPHEN 500 MG
3 TABLET ORAL
Qty: 0 | Refills: 0 | DISCHARGE
Start: 2021-03-05

## 2021-03-05 RX ORDER — INSULIN LISPRO 100/ML
VIAL (ML) SUBCUTANEOUS
Refills: 0 | Status: DISCONTINUED | OUTPATIENT
Start: 2021-03-05 | End: 2021-03-05

## 2021-03-05 RX ADMIN — BENZOCAINE AND MENTHOL 1 LOZENGE: 5; 1 LIQUID ORAL at 05:28

## 2021-03-05 RX ADMIN — AMLODIPINE BESYLATE 10 MILLIGRAM(S): 2.5 TABLET ORAL at 05:28

## 2021-03-05 RX ADMIN — HEPARIN SODIUM 5000 UNIT(S): 5000 INJECTION INTRAVENOUS; SUBCUTANEOUS at 05:28

## 2021-03-05 RX ADMIN — Medication 200 MILLIGRAM(S): at 05:28

## 2021-03-05 NOTE — DISCHARGE NOTE PROVIDER - HOSPITAL COURSE
Underwent L URS and L PCNL on 3/2.  Post op course was stable.  He passed TOV. CT scan showed no residual stone.  On 3/4 he underwent R laser lithotripsy and R stent   exchange, removal of L NT.  Post op course stable. Patient voided, pain was controlled.  At the time of discharge, the patient was hemodynamically stable, was tolerating PO diet, was voiding urine and passing stool, was ambulating, and was comfortable with adequate pain control.

## 2021-03-05 NOTE — DISCHARGE NOTE PROVIDER - NSDCCPCAREPLAN_GEN_ALL_CORE_FT
PRINCIPAL DISCHARGE DIAGNOSIS  Diagnosis: Kidney stones  Assessment and Plan of Treatment: Please follow up with Dr. Sandhu in 1 weeks.  Call [phone] to schedule/confirm your appointment.  Call or follow up sooner with fevers, chills, nausea, vomiting, increasing pain, or with other concerns.        SECONDARY DISCHARGE DIAGNOSES  Diagnosis: Diabetes  Assessment and Plan of Treatment: Continue diabetes medication, diabetic diet

## 2021-03-05 NOTE — DISCHARGE NOTE NURSING/CASE MANAGEMENT/SOCIAL WORK - PATIENT PORTAL LINK FT
You can access the FollowMyHealth Patient Portal offered by [facility] by registering at the following website: [email or web address]. By joining [employer]’s FollowMyHealth portal, you will also be able to view your health information using other applications (apps) compatible with our system.

## 2021-03-05 NOTE — PROGRESS NOTE ADULT - ASSESSMENT
A/P: 35y Male s/p right URS, laser litho, stent exchange and removal of left nephro-U POD1  DVT prophylaxis/OOB  Incentive spirometry  Analgesia and antiemetics as needed  Diet-regular   Labs  Monitor HR  IVL  DC home today

## 2021-03-05 NOTE — PROGRESS NOTE ADULT - SUBJECTIVE AND OBJECTIVE BOX
Post op Check    Pt seen and examined without complaints. Pain is controlled. Denies SOB/CP/N/V. Pt tachy from 102-113bpm.     Vital Signs Last 24 Hrs  T(C): 37.1 (04 Mar 2021 20:05), Max: 37.9 (04 Mar 2021 18:30)  T(F): 98.8 (04 Mar 2021 20:05), Max: 100.2 (04 Mar 2021 18:30)  HR: 108 (04 Mar 2021 21:00) (92 - 114)  BP: 145/89 (04 Mar 2021 21:00) (110/74 - 166/109)  BP(mean): 110 (04 Mar 2021 21:00) (110 - 130)  RR: 21 (04 Mar 2021 21:00) (16 - 26)  SpO2: 99% (04 Mar 2021 21:00) (93% - 100%)    I&O's Summary    03 Mar 2021 07:01  -  04 Mar 2021 07:00  --------------------------------------------------------  IN: 2175 mL / OUT: 2935 mL / NET: -760 mL    04 Mar 2021 07:01  -  04 Mar 2021 21:22  --------------------------------------------------------  IN: 150 mL / OUT: 3275 mL / NET: -3125 mL    Physical Exam  Gen: NAD, A&Ox3  Pulm: No respiratory distress, no subcostal retractions  CV: RRR, no JVD  Abd: Soft, NT, ND  Back: left flank incision dry, no ecchymosis   : stent on string attached to penis                           9.9    9.00  )-----------( 249      ( 04 Mar 2021 07:56 )             30.9       03-04    138  |  104  |  18  ----------------------------<  132<H>  3.6   |  23  |  1.31<H>    Ca    9.5      04 Mar 2021 07:56    
UROLOGY DAILY PROGRESS NOTE:     Subjective: Patient seen and examined at bedside. No overnight events.       Objective:  Vital signs  T(F): , Max: 100.2 (03-04-21 @ 18:30)  HR: 97 (03-05-21 @ 04:55)  BP: 129/94 (03-05-21 @ 04:55)  SpO2: 94% (03-05-21 @ 04:55)  Wt(kg): --    I&O's Summary    04 Mar 2021 07:01  -  05 Mar 2021 07:00  --------------------------------------------------------  IN: 425 mL / OUT: 5375 mL / NET: -4950 mL        Gen: NAD  Pulm: No respiratory distress, no subcostal retractions  CV: RRR, no JVD  Abd: Soft, NT, ND  Back: dressing CDI    Labs:  03-04  12.84 / 37.3  /1.24   03-04  9.00  / 30.9  /1.31                           11.8   12.84 )-----------( 282      ( 04 Mar 2021 21:08 )             37.3     03-04    141  |  101  |  14  ----------------------------<  148<H>  4.2   |  25  |  1.24    Ca    10.2      04 Mar 2021 21:08          Urine Cx:  
Urology Preop Note    Diagnosis: nephrolithiasis   Procedure: right URS, litho, stent   Surgeon: idris                           10.0   12.09 )-----------( 290      ( 03 Mar 2021 02:20 )             30.5       03-03    135  |  101  |  19  ----------------------------<  172<H>  4.1   |  22  |  1.50<H>    Ca    9.2      03 Mar 2021 02:20              UCx: neg      Orders:  NPO after midnight  IVF  Consent obtained     
UROLOGY DAILY PROGRESS NOTE:     Subjective: Patient seen and examined at bedside. No overnight events. OOB to chair. Pain controlled.       Objective:  Vital signs  T(F): , Max: 99.3 (03-02-21 @ 15:40)  HR: 92 (03-03-21 @ 06:00)  BP: 118/79 (03-03-21 @ 06:00)  SpO2: 100% (03-03-21 @ 06:00)  Wt(kg): --    I&O's Summary    02 Mar 2021 07:01  -  03 Mar 2021 07:00  --------------------------------------------------------  IN: 2225 mL / OUT: 3020 mL / NET: -795 mL    I&O's Detail    02 Mar 2021 07:01  -  03 Mar 2021 07:00  --------------------------------------------------------  IN:    IV PiggyBack: 300 mL    Lactated Ringers: 1925 mL  Total IN: 2225 mL    OUT:    Indwelling Catheter - Urethral (mL): 1920 mL    Nephrostomy Tube (mL): 1100 mL  Total OUT: 3020 mL    Total NET: -795 mL        Gen: NAD  Pulm: No respiratory distress, no subcostal retractions  CV: RRR, no JVD  Abd: Soft, NT, ND  Back: NT draining light pink, dressing changed  : Mercedes draining light pink     Labs:  03-03  12.09 / 30.5  /1.50   03-02  12.72 / 34.9  /1.53                           10.0   12.09 )-----------( 290      ( 03 Mar 2021 02:20 )             30.5     03-03    135  |  101  |  19  ----------------------------<  172<H>  4.1   |  22  |  1.50<H>    Ca    9.2      03 Mar 2021 02:20      Urine Cx:  
UROLOGY PA PROGRESS NOTE:     Subjective:  no acute events overnight, voiding well.     Objective:  Vital signs  T(F): , Max: 99.9 (03-03-21 @ 08:02)  HR: 92 (03-04-21 @ 05:08)  BP: 113/78 (03-04-21 @ 05:08)  SpO2: 95% (03-04-21 @ 05:08)  Wt(kg): --    Output     03-03 @ 07:01  -  03-04 @ 07:00  --------------------------------------------------------  IN: 2175 mL / OUT: 2935 mL / NET: -760 mL    void 1L  NT 700cc    Physical Exam:  Gen: NAD  Abd: soft, NT/ND, L flank dressing changed on am rounds, moderately saturated, L NT draining light torres urine  : voiding torres urine with small clots.     Labs:  03-03  12.09 / 30.5  /1.50   03-02  12.72 / 34.9  /1.53                           10.0   12.09 )-----------( 290      ( 03 Mar 2021 02:20 )             30.5     03-03    135  |  101  |  19  ----------------------------<  172<H>  4.1   |  22  |  1.50<H>    Ca    9.2      03 Mar 2021 02:20            Urine Cx:    Culture - Other (collected 03 Mar 2021 01:28)  Source: Skin left kidney stone culture  Preliminary Report (03 Mar 2021 22:32):    No growth    Culture - Urine (collected 03 Mar 2021 01:24)  Source: Kidney Kidney  Preliminary Report (03 Mar 2021 22:00):    No growth

## 2021-03-05 NOTE — DISCHARGE NOTE PROVIDER - NSDCFUADDINST_GEN_ALL_CORE_FT
Please follow up with Dr. Sandhu in 1 week.  Call [phone] to schedule/confirm your appointment.  Call or follow up sooner with fevers, chills, nausea, vomiting, increasing pain, or with other concerns.   Please drink 2 liters of water per day.

## 2021-03-05 NOTE — DISCHARGE NOTE PROVIDER - CARE PROVIDER_API CALL
Derick Sandhu)  Urology  [address] [place] [address]  Phone: [phone]  Fax: [phone]  Follow Up Time:

## 2021-03-05 NOTE — PROVIDER CONTACT NOTE (OTHER) - ACTION/TREATMENT ORDERED:
IVF running as ordered, will continue to assess HR, no other interventions ordered @this time, Pt encouraged to increase PO fluid intake. Safety maintained.

## 2021-03-05 NOTE — DISCHARGE NOTE PROVIDER - NSDCMRMEDTOKEN_GEN_ALL_CORE_FT
acetaminophen 325 mg oral tablet: 3 tab(s) orally every 6 hours, As needed, Temp greater or equal to 38C (100.4F), Mild Pain (1 - 3)  amLODIPine 10 mg oral tablet: 1 tab(s) orally once a day   atorvastatin 40 mg oral tablet: 1 tab(s) orally once a day (at bedtime)  metFORMIN 1000 mg oral tablet: 1 tab(s) orally 2 times a day - on hold to OR per Nephrologist  potassium citrate 10 mEq oral tablet, extended release: 2 tab(s) orally 2 times a day   sodium bicarbonate 650 mg oral tablet: 2 tab(s) orally 2 times a day   traMADol 50 mg oral tablet: 1 tab(s) orally every 6 hours, As needed, Severe Pain (7 - 10) MDD:4

## 2021-03-05 NOTE — PROVIDER CONTACT NOTE (OTHER) - ASSESSMENT
Pt A&Ox4, HR elevated to 104-106, VS otherwise stable, pt doesn't c/o pain @this time. Pt denies chest pain, dizziness, and SOB. Pt HR previously elevated in PACU where EKG and LR 1L bolus done as intervention.

## 2021-03-05 NOTE — DISCHARGE NOTE NURSING/CASE MANAGEMENT/SOCIAL WORK - NSDCPNINST_GEN_ALL_CORE
If you have any severe pain or have difficulty urinating, call your PCP, or go to the nearest emergency room. follow up with your PCP and take your medications as discussed.

## 2021-03-10 ENCOUNTER — TRANSCRIPTION ENCOUNTER (OUTPATIENT)
Age: 36
End: 2021-03-10

## 2021-03-10 ENCOUNTER — APPOINTMENT (OUTPATIENT)
Dept: UROLOGY | Facility: CLINIC | Age: 36
End: 2021-03-10
Payer: MEDICAID

## 2021-03-10 LAB
NIDUS STONE QN: SIGNIFICANT CHANGE UP
SURGICAL PATHOLOGY STUDY: SIGNIFICANT CHANGE UP
SURGICAL PATHOLOGY STUDY: SIGNIFICANT CHANGE UP

## 2021-03-10 PROCEDURE — 99024 POSTOP FOLLOW-UP VISIT: CPT

## 2021-03-11 LAB
ANION GAP SERPL CALC-SCNC: 12 MMOL/L
APPEARANCE: CLEAR
BACTERIA: NEGATIVE
BILIRUBIN URINE: NEGATIVE
BLOOD URINE: ABNORMAL
BUN SERPL-MCNC: 19 MG/DL
CALCIUM SERPL-MCNC: 10 MG/DL
CHLORIDE SERPL-SCNC: 99 MMOL/L
CO2 SERPL-SCNC: 27 MMOL/L
COLOR: NORMAL
CREAT SERPL-MCNC: 1.31 MG/DL
GLUCOSE QUALITATIVE U: NEGATIVE
GLUCOSE SERPL-MCNC: 92 MG/DL
HYALINE CASTS: 1 /LPF
KETONES URINE: NEGATIVE
LEUKOCYTE ESTERASE URINE: ABNORMAL
MICROSCOPIC-UA: NORMAL
NIDUS STONE QN: SIGNIFICANT CHANGE UP
NITRITE URINE: NEGATIVE
PH URINE: 6.5
POTASSIUM SERPL-SCNC: 4.8 MMOL/L
PROTEIN URINE: NORMAL
RED BLOOD CELLS URINE: 44 /HPF
SODIUM SERPL-SCNC: 137 MMOL/L
SPECIFIC GRAVITY URINE: 1.02
SQUAMOUS EPITHELIAL CELLS: 4 /HPF
UROBILINOGEN URINE: NORMAL
WHITE BLOOD CELLS URINE: 16 /HPF

## 2021-03-15 NOTE — HISTORY OF PRESENT ILLNESS
[None] : None [FreeTextEntry1] : 34 yo man\par follow up after surgery\par \par 3/3/2021 left PCNL\par post op CT: left side stone clear\par stone analysis: 100% uric acid\par 3/4/2021 right ureteroscopy, laser lithotripsy and insertion of ureteral stent with string\par \par patient is here for follow up\par feeling well\par already on potassium citrate and sodium bicarbonate\par flank incision is healing\par stent was removed today\par \par \par PROCEDURE NOTE\par Ureteral stent removed intact and in it's entirety using attached string tether.\par Patient tolerated well.\par Post stent removal pain/colic cations advised.\par \par

## 2021-03-15 NOTE — ASSESSMENT
[FreeTextEntry1] : next visit in 2 months with the results of 24 hour urine and ultrasound\par send urine for PH now\par sent new labs for BMP\par \par continue k-citrate and sodium bicarbonate

## 2021-03-15 NOTE — PHYSICAL EXAM
[General Appearance - Well Developed] : well developed [General Appearance - Well Nourished] : well nourished [Normal Appearance] : normal appearance [Well Groomed] : well groomed [General Appearance - In No Acute Distress] : no acute distress [Abdomen Soft] : soft [Abdomen Tenderness] : non-tender [Costovertebral Angle Tenderness] : no ~M costovertebral angle tenderness [Skin Color & Pigmentation] : normal skin color and pigmentation [Edema] : no peripheral edema [] : no respiratory distress [Respiration, Rhythm And Depth] : normal respiratory rhythm and effort [Exaggerated Use Of Accessory Muscles For Inspiration] : no accessory muscle use [Oriented To Time, Place, And Person] : oriented to person, place, and time [Affect] : the affect was normal [Mood] : the mood was normal [Not Anxious] : not anxious [Normal Station and Gait] : the gait and station were normal for the patient's age [FreeTextEntry1] : stent on string removed

## 2021-03-18 ENCOUNTER — TRANSCRIPTION ENCOUNTER (OUTPATIENT)
Age: 36
End: 2021-03-18

## 2021-03-18 RX ORDER — AMLODIPINE BESYLATE 10 MG/1
10 TABLET ORAL DAILY
Qty: 90 | Refills: 3 | Status: ACTIVE | COMMUNITY
Start: 1900-01-01 | End: 1900-01-01

## 2021-05-10 ENCOUNTER — APPOINTMENT (OUTPATIENT)
Dept: NEPHROLOGY | Facility: CLINIC | Age: 36
End: 2021-05-10
Payer: MEDICAID

## 2021-05-10 DIAGNOSIS — N17.9 ACUTE KIDNEY FAILURE, UNSPECIFIED: ICD-10-CM

## 2021-05-10 DIAGNOSIS — N20.0 CALCULUS OF KIDNEY: ICD-10-CM

## 2021-05-10 DIAGNOSIS — R82.993 HYPERURICOSURIA: ICD-10-CM

## 2021-05-10 DIAGNOSIS — N20.9 URINARY CALCULUS, UNSPECIFIED: ICD-10-CM

## 2021-05-10 PROCEDURE — 99213 OFFICE O/P EST LOW 20 MIN: CPT | Mod: 95

## 2021-05-10 RX ORDER — ALLOPURINOL 100 MG/1
100 TABLET ORAL DAILY
Qty: 90 | Refills: 3 | Status: ACTIVE | COMMUNITY
Start: 2021-05-10 | End: 1900-01-01

## 2021-05-10 RX ORDER — ISOPROPYL ALCOHOL 70 ML/100ML
70 SWAB TOPICAL
Qty: 100 | Refills: 0 | Status: ACTIVE | COMMUNITY
Start: 2021-04-20

## 2021-05-10 RX ORDER — METFORMIN HYDROCHLORIDE 1000 MG/1
1000 TABLET, COATED ORAL
Qty: 60 | Refills: 0 | Status: ACTIVE | COMMUNITY
Start: 2021-04-16

## 2021-05-10 NOTE — PHYSICAL EXAM
[General Appearance - Alert] : alert [General Appearance - In No Acute Distress] : in no acute distress [Sclera] : the sclera and conjunctiva were normal [PERRL With Normal Accommodation] : pupils were equal in size, round, and reactive to light [Extraocular Movements] : extraocular movements were intact [Outer Ear] : the ears and nose were normal in appearance [Oropharynx] : the oropharynx was normal [Neck Appearance] : the appearance of the neck was normal [] : no respiratory distress [Edema] : there was no peripheral edema [Involuntary Movements] : no involuntary movements were seen [Oriented To Time, Place, And Person] : oriented to person, place, and time [Impaired Insight] : insight and judgment were intact [Affect] : the affect was normal

## 2021-05-10 NOTE — REASON FOR VISIT
[Home] : at home, [unfilled] , at the time of the visit. [Medical Office: ([facility])___] : at the medical office located in  [Verbal consent obtained from patient] : the patient, [unfilled] [Follow-Up] : a follow-up visit

## 2021-05-12 ENCOUNTER — APPOINTMENT (OUTPATIENT)
Dept: UROLOGY | Facility: CLINIC | Age: 36
End: 2021-05-12

## 2021-05-12 ENCOUNTER — APPOINTMENT (OUTPATIENT)
Dept: UROLOGY | Facility: CLINIC | Age: 36
End: 2021-05-12
Payer: MEDICAID

## 2021-05-12 ENCOUNTER — OUTPATIENT (OUTPATIENT)
Dept: OUTPATIENT SERVICES | Facility: HOSPITAL | Age: 36
LOS: 1 days | End: 2021-05-12
Payer: MEDICAID

## 2021-05-12 DIAGNOSIS — R35.0 FREQUENCY OF MICTURITION: ICD-10-CM

## 2021-05-12 DIAGNOSIS — Z96.0 PRESENCE OF UROGENITAL IMPLANTS: Chronic | ICD-10-CM

## 2021-05-12 DIAGNOSIS — Z87.442 PERSONAL HISTORY OF URINARY CALCULI: ICD-10-CM

## 2021-05-12 PROCEDURE — 99213 OFFICE O/P EST LOW 20 MIN: CPT | Mod: 24

## 2021-05-12 PROCEDURE — 76775 US EXAM ABDO BACK WALL LIM: CPT

## 2021-05-12 PROCEDURE — 76775 US EXAM ABDO BACK WALL LIM: CPT | Mod: 26

## 2021-05-12 NOTE — ASSESSMENT
[FreeTextEntry1] : The patient is a 35 year old male presenting today for an ultrasound for a history of kidney stones. \par He currently takes potassium citrate.\par He was prescribed Allopurinol, but was advised not to start taking it until he gets blood work.\par His stone that was removed in 3/2021 was 100% uric acid. \par \par The urinalysis from 3/11/21 showed some proteinuria. \par \par His renal US from 5/12/21 demonstrated:\par Right kidney: 10.9 x 4.5 x 4.8 cm \par Cortical Thickness: 1.4 cm UP 1.5 cm LP \par \par Left kidney: 11.2 x 5.5 x 5.6 cm		 \par Cortical Thickness:1.4 cm UP 1.3 cm LP \par  \par Echogenicity: Normal\par \par Bladder: Not visualized \par \par Findings: Technically difficult study due to large body habitus. Again visualized bilateral fullness of the renal pelvises. Again visualized bilateral nonobstructing stones, in the right kidney lower pole 7.0 mm and 3.0 mm and in the left kidney lower pole 4.2 mm and 4.3 mm. Both kidneys are normal in size and echogenicity without hydronephrosis or solid masses visualized. \par \par His Litholink from 4/5/21 demonstrated:\par High sodium\par High uric acid\par Good urine output\par \par The patient produced a blood sample that will be sent for CBC, renal panel, and serum uric acid. \par \par He produced a urine sample which will be sent for urinalysis and urine culture. \par \par I recommend that the patient loses weight and continue hydrating well to decrease stone formation and decrease the size of the stones that he currently has.\par He should continue taking the potassium citrate to keep the pH of the urine high. \par \par He should complete a Litholink in 3 months.\par I am ordering an renal US to be done in 4 months.\par \par I advised the patient to return to the office in 4 months, but he is moving to [place] in June. He should have his Litholink and US results sent to me, and he will follow up with me via Telehealth.\par \par I spent 25 minutes with the patient.

## 2021-05-12 NOTE — BRIEF OPERATIVE NOTE - NSICDXBRIEFOPLAUNCH_GEN_ALL_CORE
<--- Click to Launch ICDx for PreOp, PostOp and Procedure
<--- Click to Launch ICDx for PreOp, PostOp and Procedure
Statement Selected

## 2021-05-12 NOTE — HISTORY OF PRESENT ILLNESS
[FreeTextEntry1] : The patient is a 35 year old male presenting today for an ultrasound for a history of kidney stones. \par He currently takes potassium citrate.\par He was prescribed Allopurinol, but was advised not to start taking it until he gets blood work.\par His stone that was removed in 3/2021 was 100% uric acid. \par \par The urinalysis from 3/11/21 showed some proteinuria. \par \par His renal US from 5/12/21 demonstrated:\par Right kidney: 10.9 x 4.5 x 4.8 cm \par Cortical Thickness: 1.4 cm UP 1.5 cm LP \par \par Left kidney: 11.2 x 5.5 x 5.6 cm		 \par Cortical Thickness:1.4 cm UP 1.3 cm LP \par  \par Echogenicity: Normal\par \par Bladder: Not visualized \par \par Findings: Technically difficult study due to large body habitus. Again visualized bilateral fullness of the renal pelvises. Again visualized bilateral nonobstructing stones, in the right kidney lower pole 7.0 mm and 3.0 mm and in the left kidney lower pole 4.2 mm and 4.3 mm. Both kidneys are normal in size and echogenicity without hydronephrosis or solid masses visualized. \par \par His Litholink from 4/5/21 demonstrated:\par High sodium\par High uric acid\par Good urine output\par \par The patient produced a blood sample that will be sent for CBC, renal panel, and serum uric acid. \par \par He produced a urine sample which will be sent for urinalysis and urine culture. \par \par I recommend that the patient loses weight and continue hydrating well to decrease stone formation and decrease the size of the stones that he currently has.\par He should continue taking the potassium citrate to keep the pH of the urine high. \par \par He should complete a Litholink in 3 months.\par I am ordering an renal US to be done in 4 months.\par \par I advised the patient to return to the office in 4 months, but he is moving to [place] in June. He should have his Litholink and US results sent to me, and he will follow up with me via Telehealth.\par

## 2021-05-17 ENCOUNTER — NON-APPOINTMENT (OUTPATIENT)
Age: 36
End: 2021-05-17

## 2021-05-17 LAB
ALBUMIN SERPL ELPH-MCNC: 4.5 G/DL
ANION GAP SERPL CALC-SCNC: 11 MMOL/L
APPEARANCE: CLEAR
BACTERIA: NEGATIVE
BASOPHILS # BLD AUTO: 0.04 K/UL
BASOPHILS NFR BLD AUTO: 0.5 %
BILIRUBIN URINE: NEGATIVE
BLOOD URINE: NEGATIVE
BUN SERPL-MCNC: 14 MG/DL
CALCIUM SERPL-MCNC: 9.4 MG/DL
CHLORIDE SERPL-SCNC: 104 MMOL/L
CO2 SERPL-SCNC: 24 MMOL/L
COLOR: NORMAL
CREAT SERPL-MCNC: 1 MG/DL
CREAT SPEC-SCNC: 103 MG/DL
CREAT/PROT UR: 0.1 RATIO
EOSINOPHIL # BLD AUTO: 0.39 K/UL
EOSINOPHIL NFR BLD AUTO: 4.6 %
GLUCOSE QUALITATIVE U: NEGATIVE
GLUCOSE SERPL-MCNC: 129 MG/DL
HCT VFR BLD CALC: 40.2 %
HGB BLD-MCNC: 12.3 G/DL
HYALINE CASTS: 1 /LPF
IMM GRANULOCYTES NFR BLD AUTO: 0.2 %
KETONES URINE: NEGATIVE
LEUKOCYTE ESTERASE URINE: NEGATIVE
LYMPHOCYTES # BLD AUTO: 2.52 K/UL
LYMPHOCYTES NFR BLD AUTO: 29.4 %
MAN DIFF?: NORMAL
MCHC RBC-ENTMCNC: 24.9 PG
MCHC RBC-ENTMCNC: 30.6 GM/DL
MCV RBC AUTO: 81.5 FL
MICROSCOPIC-UA: NORMAL
MONOCYTES # BLD AUTO: 0.77 K/UL
MONOCYTES NFR BLD AUTO: 9 %
NEUTROPHILS # BLD AUTO: 4.83 K/UL
NEUTROPHILS NFR BLD AUTO: 56.3 %
NITRITE URINE: NEGATIVE
PH URINE: 6
PHOSPHATE SERPL-MCNC: 3.2 MG/DL
PLATELET # BLD AUTO: 330 K/UL
POTASSIUM SERPL-SCNC: 4.2 MMOL/L
PROT UR-MCNC: 12 MG/DL
PROTEIN URINE: NORMAL
RBC # BLD: 4.93 M/UL
RBC # FLD: 13.8 %
RED BLOOD CELLS URINE: 0 /HPF
SODIUM SERPL-SCNC: 140 MMOL/L
SPECIFIC GRAVITY URINE: 1.02
SQUAMOUS EPITHELIAL CELLS: 0 /HPF
URATE SERPL-MCNC: 6.9 MG/DL
UROBILINOGEN URINE: NORMAL
WBC # FLD AUTO: 8.57 K/UL
WHITE BLOOD CELLS URINE: 1 /HPF

## 2021-11-05 ENCOUNTER — RX RENEWAL (OUTPATIENT)
Age: 36
End: 2021-11-05

## 2021-11-05 RX ORDER — POTASSIUM CITRATE 15 MEQ/1
15 MEQ TABLET, EXTENDED RELEASE ORAL
Qty: 180 | Refills: 3 | Status: ACTIVE | COMMUNITY
Start: 2021-05-10 | End: 1900-01-01

## 2023-02-23 NOTE — DISCHARGE NOTE PROVIDER - NSRESEARCHGRANT_OVERRIDEREC_GEN_A_CORE
[address] [facility] ED  eMERGENCY dEPARTMENT eNCOUnter      Pt Name: Kd Sifuentes  MRN: [id number]  [place] 1964  Date of evaluation: 2/23/2023  Provider: Patricio Ochoa PA-C    CHIEF COMPLAINT       Chief Complaint   Patient presents with    Diarrhea     Times three days          HISTORY OF PRESENT ILLNESS   (Location/Symptom, Timing/Onset,Context/Setting, Quality, Duration, Modifying Factors, Severity)  Note limiting factors. Kd Sifuentes is a 66 y.o. male who presents to the emergency department with complaint of diarrhea which patient states been ongoing for last 3 days. Patient states yesterday seem to be improving some after he had taken some Pepto-Bismol, Imodium. States he has recurrence of diarrhea again today, patient complains of generalized body aches, mild nausea, abdominal cramping. He denies any past history of abdominal issues. Rates his overall pain as a 4 out of 10. Denies any recent ill contacts. No fevers, no recent antibiotic use. No chest pain shortness of breath or dizziness. Past medical history significant for hypertension, gastric reflux, chronic back pain    HPI    NursingNotes were reviewed. REVIEW OF SYSTEMS    (2-9 systems for level 4, 10 or more for level 5)     Review of Systems   Constitutional:  Negative for activity change and appetite change. HENT:  Negative for congestion, ear discharge, ear pain, nosebleeds, rhinorrhea and sore throat. Eyes:  Negative for discharge. Respiratory:  Negative for shortness of breath. Cardiovascular:  Negative for chest pain, palpitations and leg swelling. Gastrointestinal:  Positive for abdominal pain, diarrhea and nausea. Negative for abdominal distention, constipation and vomiting. Genitourinary:  Negative for difficulty urinating and dysuria. Musculoskeletal:  Negative for arthralgias. Skin:  Negative for color change. Neurological:  Negative for dizziness, syncope, numbness and headaches. Psychiatric/Behavioral:  Negative for agitation and confusion. Except as noted above the remainder of the review of systems was reviewed and negative. PAST MEDICAL HISTORY     Past Medical History:   Diagnosis Date    GERD (gastroesophageal reflux disease)     Hypertension          SURGICALHISTORY       Past Surgical History:   Procedure Laterality Date    HERNIA REPAIR      JOINT REPLACEMENT           CURRENT MEDICATIONS       Discharge Medication List as of 2/23/2023  8:58 AM        CONTINUE these medications which have NOT CHANGED    Details   metoprolol tartrate (LOPRESSOR) 50 MG tablet Take 50 mg by mouth 2 times dailyHistorical Med      methocarbamol (ROBAXIN) 750 MG tablet Take 750 mg by mouth 3 times daily as neededHistorical Med      loratadine (CLARITIN) 10 MG capsule Historical Med      Calcium-Magnesium (CALCIUM MAGNESIUM 750) 300-300 MG TABS Historical Med      Black Pepper-Turmeric (TURMERIC CURCUMIN) 5-1000 MG CAPS 1,000 mgHistorical Med      Vitamin D, Cholecalciferol, 10 MCG (400 UNIT) CAPS 1,000 Int'l UnitsHistorical Med      gabapentin (NEURONTIN) 600 MG tablet Historical Med      Methylcobalamin 1 MG CHEW Historical Med      amLODIPine (NORVASC) 5 MG tablet Take 5 mg by mouth dailyHistorical Med      Ca Phosphate-Cholecalciferol 115-2000 MG-UNIT TABS Take 2,000-4,000 Units by mouth dailyHistorical Med      gabapentin (NEURONTIN) 300 MG capsule Take 300 mg by mouth three times daily. Tanda Bun Historical Med      ibuprofen (ADVIL;MOTRIN) 600 MG tablet Take 600 mg by mouth as neededHistorical Med      loperamide (IMODIUM A-D) 2 MG tablet Take 1 mg by mouth as neededHistorical Med      omeprazole (PRILOSEC) 20 MG delayed release capsule Take 20 mg by mouth dailyHistorical Med                  Clonazepam, Doxycycline, Naproxen, Oxaprozin, Penicillins, and Sulfa antibiotics    FAMILY HISTORY     History reviewed. No pertinent family history.        SOCIAL HISTORY       Social History Socioeconomic History    Marital status:      Spouse name: None    Number of children: None    Years of education: None    Highest education level: None   Tobacco Use    Smoking status: Never    Smokeless tobacco: Current     Types: Chew   Vaping Use    Vaping Use: Never used   Substance and Sexual Activity    Alcohol use: Yes     Comment: occasional     Drug use: No    Sexual activity: Yes     Partners: Female       SCREENINGS    [place] Coma Scale  Eye Opening: Spontaneous  Best Verbal Response: Oriented  Best Motor Response: Obeys commands  [place] Coma Scale Score: 15        PHYSICAL EXAM    (up to 7 for level 4, 8 or more for level 5)     ED Triage Vitals [02/23/23 0727]   BP Temp Temp Source Heart Rate Resp SpO2 Height Weight   129/82 97.9 °F (36.6 °C) Temporal 59 18 98 % 5' 7\" (1.702 m) 184 lb (83.5 kg)       Physical Exam  Vitals and nursing note reviewed. Constitutional:       General: He is not in acute distress. Appearance: Normal appearance. He is well-developed. He is not ill-appearing, toxic-appearing or diaphoretic. Comments: Looks well, nontoxic appearance   HENT:      Head: Normocephalic. Nose: No congestion. Mouth/Throat:      Mouth: Mucous membranes are moist.      Pharynx: No oropharyngeal exudate or posterior oropharyngeal erythema. Eyes:      Extraocular Movements: Extraocular movements intact. Conjunctiva/sclera: Conjunctivae normal.      Pupils: Pupils are equal, round, and reactive to light. Neck:      Vascular: No JVD. Trachea: No tracheal deviation. Cardiovascular:      Rate and Rhythm: Normal rate. Pulses: Normal pulses. Heart sounds: Normal heart sounds. No murmur heard. No friction rub. No gallop. Pulmonary:      Effort: Pulmonary effort is normal. No tachypnea, accessory muscle usage, respiratory distress or retractions. Breath sounds: No stridor. No wheezing, rhonchi or rales. Chest:      Chest wall: No tenderness. Abdominal:      General: Abdomen is flat. Bowel sounds are normal. There is no distension or abdominal bruit. Palpations: Abdomen is soft. There is no shifting dullness, fluid wave, hepatomegaly, splenomegaly, mass or pulsatile mass. Tenderness: There is no abdominal tenderness. There is no right CVA tenderness, left CVA tenderness, guarding or rebound. Negative signs include Loja's sign, Rovsing's sign and McBurney's sign. Comments: Abdomen soft nondistended nontender no guarding mass rebound, no CVA tenderness. Musculoskeletal:         General: No deformity. Cervical back: Normal range of motion and neck supple. No rigidity. Skin:     General: Skin is warm and dry. Capillary Refill: Capillary refill takes less than 2 seconds. Coloration: Skin is not jaundiced. Neurological:      General: No focal deficit present. Mental Status: He is alert and oriented to person, place, and time. Mental status is at baseline. Cranial Nerves: No cranial nerve deficit. Sensory: No sensory deficit. Motor: No weakness.       Coordination: Coordination normal.   Psychiatric:         Mood and Affect: Mood normal.       RESULTS     EKG: All EKG's are interpreted by the Emergency Department Physician who either signs or Co-signsthis chart in the absence of a cardiologist.        RADIOLOGY:   Solomon Leghorn such as CT, Ultrasound and MRI are read by the radiologist. Plain radiographic images are visualized and preliminarily interpreted by the emergency physician with the below findings:      Interpretation per the Radiologist below, if available at the time ofthis note:    No orders to display         ED BEDSIDE ULTRASOUND:   Performed by ED Physician - none    LABS:  Labs Reviewed   CBC WITH AUTO DIFFERENTIAL - Abnormal; Notable for the following components:       Result Value    RBC 4.56 (*)     MCV 94.9 (*)     MCH 31.8 (*)     Lymphocytes Absolute 0.5 (*)     All other components within normal limits   COMPREHENSIVE METABOLIC PANEL - Abnormal; Notable for the following components:    Glucose 110 (*)     All other components within normal limits   COVID-19, RAPID   RAPID INFLUENZA A/B ANTIGENS   LACTIC ACID   LIPASE       All other labs were within normal range or not returned as of this dictation. EMERGENCY DEPARTMENT COURSE and DIFFERENTIAL DIAGNOSIS/MDM:   Vitals:    Vitals:    02/23/23 0727 02/23/23 0830 02/23/23 0835   BP: 129/82 113/68 113/68   Pulse: 59  51   Resp: 18     Temp: 97.9 °F (36.6 °C)     TempSrc: Temporal     SpO2: 98% 96% 91%   Weight: 184 lb (83.5 kg)     Height: 5' 7\" (1.702 m)              Medical Decision Making  Present to ED with complaint of diarrhea which she states been ongoing for last 3 days, he has been using over-the-counter Imodium without any significant relief, he states able to eat and drink, he presents with no fevers, he has not been around anybody known to be ill. He is negative for COVID-19, negative for influenza, does not present with a fever, he is not tachycardic, and white count is 5.7 thousand, findings are more suspicious for that of viral illness, he was given a prescription for Lomotil and advised to contact and follow-up with his regular family provider in next 2 to 3 days, as well as given referral to GI services should he have ongoing diarrhea. Patient was advised if he has worsening or change symptoms, return to ED for reevaluation. Amount and/or Complexity of Data Reviewed  Labs: ordered. Risk  Prescription drug management. Coding     CONSULTS:  None    PROCEDURES:  Unless otherwise noted below, none     Procedures    FINAL IMPRESSION      1.  Diarrhea, unspecified type          DISPOSITION/PLAN   DISPOSITION Decision To Discharge 02/23/2023 08:51:10 AM      PATIENT REFERRED TO:  Edith Cornelius MD  [address] [place] [address]  [phone]    In 3 days      Valentín Love MD  [place] [address] [address]  [facility] [address]  [phone]    In 1 week      DISCHARGE MEDICATIONS:  Discharge Medication List as of 2/23/2023  8:58 AM        START taking these medications    Details   diphenoxylate-atropine (LOMOTIL) 2.5-0.025 MG per tablet Take 1 tablet by mouth 4 times daily as needed for Diarrhea (every 6 hours as needed for diarrhea control) for up to 12 doses.  Max Daily Amount: 4 tablets, Disp-12 tablet, R-0Print                (Please note that portions of this note were completed with a voice recognition program.  Efforts were made to edit the dictations but occasionally words are mis-transcribed.)    Pancho Vicente PA-C (electronically signed)  Attending Emergency Physician         Pancho Vicente PA-C  02/23/23 9112 This is a surgical and/or non-medical patient.